# Patient Record
Sex: MALE | Race: WHITE | Employment: OTHER | URBAN - METROPOLITAN AREA
[De-identification: names, ages, dates, MRNs, and addresses within clinical notes are randomized per-mention and may not be internally consistent; named-entity substitution may affect disease eponyms.]

---

## 2017-01-31 DIAGNOSIS — G47.00 INSOMNIA: Primary | ICD-10-CM

## 2017-01-31 NOTE — TELEPHONE ENCOUNTER
Trazodone       Last Written Prescription Date: 5/17/2016  Last Fill Quantity: 180,  # refills: 3  Last Office Visit with [facility], [facility] or [facility] prescribing provider: 12/5/2016

## 2017-02-02 RX ORDER — TRAZODONE HYDROCHLORIDE 100 MG/1
TABLET ORAL
Qty: 180 TABLET | Refills: 3 | OUTPATIENT
Start: 2017-02-02

## 2017-02-02 NOTE — TELEPHONE ENCOUNTER
[facility] called regarding status of Trazodone refill. Please call. [phone].

## 2017-02-04 DIAGNOSIS — G47.01 INSOMNIA DUE TO MEDICAL CONDITION: ICD-10-CM

## 2017-02-06 NOTE — TELEPHONE ENCOUNTER
TRAZODONE 100 MG TABLET         Last Written Prescription Date: 5/17/2016  Last Fill Quantity: 180; # refills: 3  Last Office Visit with [facility], [facility] or  [facility] prescribing provider:  12/5/2016 BRAD        Last PHQ-9 score on record=   PHQ-9 SCORE 12/5/2016   Total Score -   Total Score 8       AST       18   8/30/2010  ALT       16   4/21/2011

## 2017-02-07 RX ORDER — TRAZODONE HYDROCHLORIDE 100 MG/1
TABLET ORAL
Qty: 180 TABLET | Refills: 3 | Status: SHIPPED | OUTPATIENT
Start: 2017-02-07 | End: 2017-06-02

## 2017-02-07 NOTE — TELEPHONE ENCOUNTER
Jeffy is calling to check on the status is this request. Please let him know if this will be filled or not.     Thank you. Charmaine Cloud, Patient Representative

## 2017-02-15 ENCOUNTER — TELEPHONE (OUTPATIENT)
Dept: FAMILY MEDICINE | Facility: CLINIC | Age: 67
End: 2017-02-15

## 2017-02-15 NOTE — TELEPHONE ENCOUNTER
Reason for Call:  Medication or medication refill:    Do you use a [facility]?  Name of the pharmacy and phone number for the current request:  [facility]    Name of the medication requested: pantoprazole (PROTONIX) 40 MG EC tablet needs to be changed to something different. His insurance will no longer pay for this    Other request:     Can we leave a detailed message on this number? YES    Phone number patient can be reached at: Cell number on file:    Telephone Information:   Mobile [phone]     Best Time: any    Call taken on 2/15/2017 at 4:27 PM by Angelica Hansen

## 2017-02-16 NOTE — TELEPHONE ENCOUNTER
I need someone to call his pharmacy or the patient and find out what we can switch him to.    Logan Dewey MD

## 2017-02-16 NOTE — TELEPHONE ENCOUNTER
I called the pharmacy and they tried running a rx for omeprazole and he also came back denied. She stated that she doesn't think that his insurance will cover any PPI's .  Hollie Joe MA

## 2017-04-19 DIAGNOSIS — N13.8 BPH WITH URINARY OBSTRUCTION: ICD-10-CM

## 2017-04-19 DIAGNOSIS — N40.1 BPH WITH URINARY OBSTRUCTION: ICD-10-CM

## 2017-04-19 NOTE — TELEPHONE ENCOUNTER
dutasteride (AVODART) 0.5 MG capsule         Last Written Prescription Date: 4/21/16  Last Fill Quantity: 90, # refills: 3    Last Office Visit with [facility], [facility] or [facility] prescribing provider:  12/5/16   Future Office Visit:      BP Readings from Last 3 Encounters:   12/05/16 110/70   09/02/16 126/74   05/05/16 118/70

## 2017-04-20 RX ORDER — DUTASTERIDE 0.5 MG/1
CAPSULE, LIQUID FILLED ORAL
Qty: 90 CAPSULE | Refills: 2 | Status: SHIPPED | OUTPATIENT
Start: 2017-04-20 | End: 2017-12-27

## 2017-04-25 ENCOUNTER — TELEPHONE (OUTPATIENT)
Dept: FAMILY MEDICINE | Facility: CLINIC | Age: 67
End: 2017-04-25

## 2017-04-25 NOTE — TELEPHONE ENCOUNTER
[facility] calling to fu and states this is not a covered medication and does need a prior auth.  Please advise  Thank you,  Tanvi Haynes  Patient Representative

## 2017-05-17 NOTE — TELEPHONE ENCOUNTER
Please call Chavez back regarding this pts PA.  Chavez states it is urgent and would appreciate a call back ASAP.

## 2017-05-31 NOTE — PROGRESS NOTES
"  SUBJECTIVE:                                                    Jeffy Rogers is a 66 year old male who presents to clinic today for the following health issues:  {Provider please address medication reconciliation discrepancies--rooming staff please delete if no med/rec issues}    New Patient/Transfer of Care  Leg Pain     Onset: ***    Description:   Location: {.:018279::\"***\"}  Character: {.:899806}    Intensity: {.:853716}    Progression of Symptoms: {.:740342:x}    Accompanying Signs & Symptoms:  Other symptoms: {.:413389::\"none\"}   History:   Previous similar pain: { :155170}      Precipitating factors:   Trauma or overuse: { :044434}    Alleviating factors:  Improved by: {.:973233::\"nothing\"}       Therapies Tried and outcome: ***      {additional problems for provider to add:258191}    Problem list and histories reviewed & adjusted, as indicated.  Additional history: {NONE - AS DOCUMENTED:124100::\"as documented\"}    {HIST REVIEW/ LINKS 2:763541}    Reviewed and updated as needed this visit by clinical staff       Reviewed and updated as needed this visit by Provider         {PROVIDER CHARTING PREFERENCE:074050}    "

## 2017-06-02 ENCOUNTER — OFFICE VISIT (OUTPATIENT)
Dept: FAMILY MEDICINE | Facility: OTHER | Age: 67
End: 2017-06-02
Payer: COMMERCIAL

## 2017-06-02 VITALS
TEMPERATURE: 99.4 F | WEIGHT: 172.4 LBS | SYSTOLIC BLOOD PRESSURE: 134 MMHG | HEIGHT: 70 IN | RESPIRATION RATE: 18 BRPM | DIASTOLIC BLOOD PRESSURE: 68 MMHG | BODY MASS INDEX: 24.68 KG/M2 | HEART RATE: 60 BPM

## 2017-06-02 DIAGNOSIS — R79.89 LOW TSH LEVEL: ICD-10-CM

## 2017-06-02 DIAGNOSIS — G89.29 CHRONIC RIGHT-SIDED LOW BACK PAIN WITH RIGHT-SIDED SCIATICA: ICD-10-CM

## 2017-06-02 DIAGNOSIS — N18.30 CKD (CHRONIC KIDNEY DISEASE) STAGE 3, GFR 30-59 ML/MIN (H): ICD-10-CM

## 2017-06-02 DIAGNOSIS — F51.04 PSYCHOPHYSIOLOGICAL INSOMNIA: Primary | ICD-10-CM

## 2017-06-02 DIAGNOSIS — M79.2 NERVE PAIN: ICD-10-CM

## 2017-06-02 DIAGNOSIS — G89.29 CHRONIC PAIN OF RIGHT LOWER EXTREMITY: ICD-10-CM

## 2017-06-02 DIAGNOSIS — M79.604 CHRONIC PAIN OF RIGHT LOWER EXTREMITY: ICD-10-CM

## 2017-06-02 DIAGNOSIS — M54.41 CHRONIC RIGHT-SIDED LOW BACK PAIN WITH RIGHT-SIDED SCIATICA: ICD-10-CM

## 2017-06-02 DIAGNOSIS — M79.604 PAIN OF RIGHT LOWER EXTREMITY: ICD-10-CM

## 2017-06-02 DIAGNOSIS — Z23 NEED FOR VACCINATION: ICD-10-CM

## 2017-06-02 DIAGNOSIS — I10 HYPERTENSION GOAL BP (BLOOD PRESSURE) < 140/90: ICD-10-CM

## 2017-06-02 DIAGNOSIS — F17.200 TOBACCO USE DISORDER: ICD-10-CM

## 2017-06-02 DIAGNOSIS — M48.061 FORAMINAL STENOSIS OF LUMBAR REGION: ICD-10-CM

## 2017-06-02 LAB — HGB BLD-MCNC: 13.2 G/DL (ref 13.3–17.7)

## 2017-06-02 PROCEDURE — 80053 COMPREHEN METABOLIC PANEL: CPT | Performed by: FAMILY MEDICINE

## 2017-06-02 PROCEDURE — 99214 OFFICE O/P EST MOD 30 MIN: CPT | Mod: 25 | Performed by: FAMILY MEDICINE

## 2017-06-02 PROCEDURE — 36415 COLL VENOUS BLD VENIPUNCTURE: CPT | Performed by: FAMILY MEDICINE

## 2017-06-02 PROCEDURE — 90471 IMMUNIZATION ADMIN: CPT | Performed by: FAMILY MEDICINE

## 2017-06-02 PROCEDURE — 85018 HEMOGLOBIN: CPT | Performed by: FAMILY MEDICINE

## 2017-06-02 PROCEDURE — 84443 ASSAY THYROID STIM HORMONE: CPT | Performed by: FAMILY MEDICINE

## 2017-06-02 PROCEDURE — 90670 PCV13 VACCINE IM: CPT | Performed by: FAMILY MEDICINE

## 2017-06-02 RX ORDER — CYCLOBENZAPRINE HCL 10 MG
10 TABLET ORAL 3 TIMES DAILY
Qty: 270 TABLET | Refills: 1 | Status: SHIPPED | OUTPATIENT
Start: 2017-06-02 | End: 2017-12-27

## 2017-06-02 RX ORDER — NORTRIPTYLINE HCL 10 MG
10-50 CAPSULE ORAL AT BEDTIME
Qty: 90 CAPSULE | Refills: 1 | Status: SHIPPED | OUTPATIENT
Start: 2017-06-02 | End: 2017-10-27

## 2017-06-02 RX ORDER — VARENICLINE TARTRATE 1 MG/1
1 TABLET, FILM COATED ORAL 2 TIMES DAILY
Qty: 56 TABLET | Refills: 2 | Status: SHIPPED | OUTPATIENT
Start: 2017-06-02 | End: 2017-12-27

## 2017-06-02 ASSESSMENT — ANXIETY QUESTIONNAIRES
2. NOT BEING ABLE TO STOP OR CONTROL WORRYING: NOT AT ALL
GAD7 TOTAL SCORE: 2
6. BECOMING EASILY ANNOYED OR IRRITABLE: SEVERAL DAYS
3. WORRYING TOO MUCH ABOUT DIFFERENT THINGS: NOT AT ALL
7. FEELING AFRAID AS IF SOMETHING AWFUL MIGHT HAPPEN: NOT AT ALL
IF YOU CHECKED OFF ANY PROBLEMS ON THIS QUESTIONNAIRE, HOW DIFFICULT HAVE THESE PROBLEMS MADE IT FOR YOU TO DO YOUR WORK, TAKE CARE OF THINGS AT HOME, OR GET ALONG WITH OTHER PEOPLE: NOT DIFFICULT AT ALL
5. BEING SO RESTLESS THAT IT IS HARD TO SIT STILL: NOT AT ALL
1. FEELING NERVOUS, ANXIOUS, OR ON EDGE: NOT AT ALL

## 2017-06-02 ASSESSMENT — PATIENT HEALTH QUESTIONNAIRE - PHQ9: 5. POOR APPETITE OR OVEREATING: SEVERAL DAYS

## 2017-06-02 ASSESSMENT — PAIN SCALES - GENERAL: PAINLEVEL: NO PAIN (0)

## 2017-06-02 NOTE — PATIENT INSTRUCTIONS
Thank you for visiting [facility]    Let's try a low dose of nortriptyline to help with your sleep and pain.  Can gradually increase this up to 50 mg at a time if needed.  Let me know if side effects.  Don't take with flexeril (separate by at least a few hours).    OK to continue with ibuprofen for pain.  Let's also try some physical therapy for this.  If worsening, then we should consider imaging or referral.    Take Chantix as we discussed.  Let me know if problems.    Contact us or return if questions or concerns.     We'll let you know your lab results as soon as we can.     Please see me in 1-2 months for follow up.       If you had imaging scheduled please refer to your radiology prep sheet.    Appointment    Date_______________     Time_____________    Day:   M TU W TH F    With____________________________    Location_________________________    If you need medication refills, please contact your pharmacy 3 days before your prescriptions runs out. If you are out of refills, your pharmacy will contact contact the clinic.    Contact us or return if questions or concerns.     -Your Care Team:  MD Marlys Jeffrey PA-C Folake Falaki, MD Anoshirvan Mazhari, MD Kelly White, CNP    General information about your clinic      Clinic hours:     Lab hours:  Phone [phone]  Monday 7:30 am-7 pm    Monday 8:30 am-6:30 pm  Tuesday-Friday 7:30 am-5 pm   Tuesday-Friday 8:30 am-4:30 pm    Pharmacy hours:  Phone [phone]  Monday 8:30 am-7pm  Tuesday-Friday 8:30am-6 pm                                       Mychart assistance [phone]        We would like to hear from you, how was your visit today?    Karla Dobbs  Patient Information Supervisor   Patient Care Supervisor  [facility], and [facility], and [facility]  [phone]

## 2017-06-02 NOTE — NURSING NOTE
Screening Questionnaire for Adult Immunization    Are you sick today?   No   Do you have allergies to medications, food, a vaccine component or latex?   No   Have you ever had a serious reaction after receiving a vaccination?   No   Do you have a long-term health problem with heart disease, lung disease, asthma, kidney disease, metabolic disease (e.g. diabetes), anemia, or other blood disorder?   No   Do you have cancer, leukemia, HIV/AIDS, or any other immune system problem?   No   In the past 3 months, have you taken medications that affect  your immune system, such as prednisone, other steroids, or anticancer drugs; drugs for the treatment of rheumatoid arthritis, Crohn s disease, or psoriasis; or have you had radiation treatments?   No   Have you had a seizure, or a brain or other nervous system problem?   No   During the past year, have you received a transfusion of blood or blood     products, or been given immune (gamma) globulin or antiviral drug?   No   For women: Are you pregnant or is there a chance you could become        pregnant during the next month?   No   Have you received any vaccinations in the past 4 weeks?   No     Immunization questionnaire answers were all negative.      MNVFC doesn't apply on this patient    Per orders of Dr. Browne, injection of PCV 13 given by Karo March. Patient instructed to remain in clinic for 20 minutes afterwards, and to report any adverse reaction to me immediately.       Screening performed by Karo March on 6/2/2017 at 4:20 PM.

## 2017-06-02 NOTE — PROGRESS NOTES
SUBJECTIVE:                                                    Jeffy Rogers is a 66 year old male who presents to clinic today for the following health issues:      New Patient/Transfer of Care  Depression Followup    Status since last visit: Depends on the situation    See PHQ-9 for current symptoms.  Other associated symptoms: insomnia    Complicating factors:   Significant life event:  No   Current substance abuse:  None  Anxiety or Panic symptoms:  No    PHQ-9  English PHQ-9   Any Language          Chronic Kidney Disease Follow-up      Current NSAID use?  Yes:   ibuprofen (Motrin) and aspirin  Frequency: daily      Chronic Pain Follow-Up       Type / Location of Pain: leg/hip  Analgesia/pain control:       Recent changes:  same      Overall control: Tolerable with discomfort  Activity level/function:      Daily activities:  Can do most things most days, with some rest    Work:  not applicable  Adverse effects:  No  Adherance    Taking medication as directed?  Yes    Participating in other treatments: no - open to PT  Risk Factors:    Sleep:  Poor feels as though the trazodone is not working     Mood/anxiety:  controlled    Recent family or social stressors:  none noted    Other aggravating factors: Climbing stairs, standing for long periods of time and walking   PHQ-9 SCORE 11/20/2015 4/21/2016 12/5/2016   Total Score - - -   Total Score 1 6 8     DEBRA-7 SCORE 10/28/2014 12/5/2016   Total Score 0 -   Total Score - 0     Encounter-Level CSA:     There are no encounter-level csa.             Amount of exercise or physical activity: Moving around daily- mowing lawn, gardening, walking dogs    Problems taking medications regularly: No    Medication side effects: none    Diet: regular (no restrictions)    Mr. Rogers presents to clinic with concerns related to sleep, right thigh pain, and smoking cessation.    His sleep has been a problem for several years. He's been taking trazodone since the early 2000s. It seems  "to be less effective for about 6 months. He goes to bed at 11 pm or 12 am watching TV in bed. He has trouble falling asleep. He wakes up around 5 am when his wife leaves for work. He wakes up at 5 on the weekends too.      His right thigh pain and numbness has bothered him for 3-4 years. It's his right anterior and lateral thigh. It's burning-type pain that improves when he brings his knee to his chest. He takes ibuprofen with limited relief. States he's tried PT, worried about effect on income.    He wants Chantix for smoking cessation. He did this about a year ago and he quit due to nausea. He also reports bad dreams on this. But he thought it was effective for stopping cravings and he wants to try it again. He tried the patch in the past.    Reviewed and updated as needed this visit by clinical staff  Tobacco  Allergies  Med Hx  Surg Hx  Fam Hx  Soc Hx      Reviewed and updated as needed this visit by Provider         ROS:  C: NEGATIVE for fever, chills, change in weight  I: NEGATIVE for worrisome rashes, moles or lesions  E: NEGATIVE for vision changes or irritation  E/M: NEGATIVE for ear, mouth and throat problems  R: NEGATIVE for significant cough or SOB  CV: NEGATIVE for chest pain, palpitations or peripheral edema  GI: NEGATIVE for nausea, abdominal pain, or change in bowel habits  : NEGATIVE for frequency, dysuria, or hematuria  M: See HPI  N: NEGATIVE for weakness, dizziness  E: NEGATIVE for temperature intolerance, skin/hair changes  H: NEGATIVE for bleeding problems  P: NEGATIVE for changes in mood or affect    OBJECTIVE:                                                    /68 (Cuff Size: Adult Regular)  Pulse 60  Temp 99.4  F (37.4  C) (Temporal)  Resp 18  Ht 5' 10\" (1.778 m)  Wt 172 lb 6.4 oz (78.2 kg)  BMI 24.74 kg/m2  Body mass index is 24.74 kg/(m^2).     GENERAL: healthy, alert and no distress  EYES: Eyes grossly normal to inspection, PERRL and conjunctivae and sclerae " normal  HENT: ear canals and TM's normal, nose and mouth without ulcers or lesions  NECK: no adenopathy, no asymmetry, masses, or scars and thyroid normal to palpation  RESP: lungs clear to auscultation - no rales, rhonchi or wheezes  CV: regular rate and rhythm, normal S1 S2, no S3 or S4, no murmur, click or rub, no peripheral edema and peripheral pulses strong  ABDOMEN: soft, nontender, no hepatosplenomegaly, no masses and bowel sounds normal  MS: no gross musculoskeletal defects noted, no edema, no back tenderness  SKIN: no suspicious lesions or rashes  NEURO: 3/5 right thigh flexor strength, 5/5 left thigh flexor strength, patellar reflexes 2+ bilaterally, 3/5 leg flexor and extensor strength on right and 5/5 on left  PSYCH: mentation appears normal, affect normal/bright    Diagnostic Test Results:  Results for orders placed or performed in visit on 12/12/16   T4, free   Result Value Ref Range    T4 Free 0.78 0.76 - 1.46 ng/dL   T3, Free   Result Value Ref Range    Free T3 2.2 (L) 2.3 - 4.2 pg/mL        ASSESSMENT/PLAN:                                                      1. Psychophysiological insomnia  - Problem for several years, treated successfully with trazodone until 6 months ago. He notices trouble falling asleep and is sleeping 5-6 hours instead of 7-8 hours per night. We counseled him on sleep hygiene including not using TV before bed. Discussed options at some length.  Pt was interested in trial of nortriptyline in place of trazodone.  Would also consider Seroquel if not responding.  Can titrate his dose of nortriptyline within parameters given.  Could consider sedative/hypnotics, but would prefer to avoid in his age category.  - nortriptyline (PAMELOR) 10 MG capsule; Take 1-5 capsules (10-50 mg) by mouth At Bedtime  Dispense: 90 capsule; Refill: 1    2. Pain of right lower extremity  - This is likely due to foraminal stenosis in his lower back. The distribution fits an L3 dermatome and he is known to  have mild disc bulge without herniation and central canal stenosis here based on 7/2015 MRI. He has decreased thigh flexor strength on exam. We're recommending PT (can consider once per month as he's concerned about cost), and the nortriptyline for his insomnia may reduce the pain as well, and he can continue ibuprofen.  Pt didn't discuss narcotics with me (but did with the student).  Would need to do a formal narcotics agreement to consider this.  - PHYSICAL THERAPY REFERRAL  - nortriptyline (PAMELOR) 10 MG capsule; Take 1-5 capsules (10-50 mg) by mouth At Bedtime  Dispense: 90 capsule; Refill: 1    3. Foraminal stenosis of lumbar region  - See No. 2  - PHYSICAL THERAPY REFERRAL  - nortriptyline (PAMELOR) 10 MG capsule; Take 1-5 capsules (10-50 mg) by mouth At Bedtime  Dispense: 90 capsule; Refill: 1    4. Tobacco use disorder  - He's interested in quitting. He tried Chantix in spring 2016 and found it helpful, though he reports vivid dreams and ultimately quit due to nausea. He's tried the patch in the past but this wasn't helpful. He specifically requests another course of Chantix.  - TOBACCO CESSATION - FOR HEALTH MAINTENANCE  - varenicline (CHANTIX) 1 MG tablet; Take 1 tablet (1 mg) by mouth 2 times daily  Dispense: 56 tablet; Refill: 2  - varenicline (CHANTIX STARTING MONTH PAK) 0.5 MG X 11 & 1 MG X 42 tablet; Take 0.5 mg tab daily for 3 days, then 0.5 mg tab twice daily for 4 days, then 1 mg twice daily.  Dispense: 53 tablet; Refill: 0    5. CKD (chronic kidney disease) stage 3, GFR 30-59 ml/min  Will check labs.  Work on risk factor reduction.  - Hemoglobin  - Comprehensive metabolic panel    6. Hypertension goal BP (blood pressure) < 140/90  Currently Controlled.  Continue current regimen.  Check labs.  Call/return if any problems or questions arise.   - Comprehensive metabolic panel    7. Low TSH level  - Anomalous TSH level of 0.08 in 12/2016. Rechecking.  - TSH with free T4 reflex    8. Chronic  right-sided low back pain with right-sided sciatica  - See No. 2 above  - cyclobenzaprine (FLEXERIL) 10 MG tablet; Take 1 tablet (10 mg) by mouth 3 times daily  Dispense: 270 tablet; Refill: 1  - nortriptyline (PAMELOR) 10 MG capsule; Take 1-5 capsules (10-50 mg) by mouth At Bedtime  Dispense: 90 capsule; Refill: 1    9. Chronic pain of right lower extremity  - See No. 2 above  - cyclobenzaprine (FLEXERIL) 10 MG tablet; Take 1 tablet (10 mg) by mouth 3 times daily  Dispense: 270 tablet; Refill: 1    10. Nerve pain  - See No. 2 above  - cyclobenzaprine (FLEXERIL) 10 MG tablet; Take 1 tablet (10 mg) by mouth 3 times daily  Dispense: 270 tablet; Refill: 1    11. Need for vaccination  - Pneumococcal vaccine 13 valent PCV13 IM (Prevnar) [23640]  - ADMIN: Vaccine, Initial (02711)        Patient Instructions   Thank you for visiting [facility]    Let's try a low dose of nortriptyline to help with your sleep and pain.  Can gradually increase this up to 50 mg at a time if needed.  Let me know if side effects.  Don't take with flexeril (separate by at least a few hours).    OK to continue with ibuprofen for pain.  Let's also try some physical therapy for this.  If worsening, then we should consider imaging or referral.    Take Chantix as we discussed.  Let me know if problems.    Contact us or return if questions or concerns.     We'll let you know your lab results as soon as we can.     Please see me in 1-2 months for follow up.       If you had imaging scheduled please refer to your radiology prep sheet.    Appointment    Date_______________     Time_____________    Day:   M TU W TH F    With____________________________    Location_________________________    If you need medication refills, please contact your pharmacy 3 days before your prescriptions runs out. If you are out of refills, your pharmacy will contact contact the clinic.    Contact us or return if questions or concerns.     -Your Care  Team:  MD Marlys Jeffrey PA-C Folake Falaki, MD Anoshirvan Mazhari, MD Kelly White, CNP    General information about your clinic      Clinic hours:     Lab hours:  Phone [phone]  Monday 7:30 am-7 pm    Monday 8:30 am-6:30 pm  Tuesday-Friday 7:30 am-5 pm   Tuesday-Friday 8:30 am-4:30 pm    Pharmacy hours:  Phone [phone]  Monday 8:30 am-7pm  Tuesday-Friday 8:30am-6 pm                                       Mychart assistance [phone]        We would like to hear from you, how was your visit today?    Karla oDbbs  Patient Information Supervisor   Patient Care Supervisor  [facility], and [facility]  [phone]         Scribed by Black Johnson MS4.    This patient was seen and examined by myself as well as the medical student.  The medical student has scribed the note and I have reviewed it, edited it appropriately and agree with the final documentation. Electronically signed by MD Benton Jeffrey MD, MD  [facility]

## 2017-06-02 NOTE — NURSING NOTE
"Chief Complaint   Patient presents with     Establish Care     Musculoskeletal Problem     Wants chantix     Panel Management     PCV13, Fall risk, Tobacco cessation, phq, amira, HGB, CSA, Urine drug screen       Initial /68 (Cuff Size: Adult Regular)  Pulse 60  Temp 99.4  F (37.4  C) (Temporal)  Resp 18  Ht 5' 10\" (1.778 m)  Wt 172 lb 6.4 oz (78.2 kg)  BMI 24.74 kg/m2 Estimated body mass index is 24.74 kg/(m^2) as calculated from the following:    Height as of this encounter: 5' 10\" (1.778 m).    Weight as of this encounter: 172 lb 6.4 oz (78.2 kg).  Medication Reconciliation: complete   Ally Murrell CMA (AAMA)    "

## 2017-06-02 NOTE — MR AVS SNAPSHOT
After Visit Summary   6/2/2017    Jeffy Rogers    MRN: [id number]           Patient Information     Date Of Birth          1950        Visit Information        Provider Department      6/2/2017 2:45 PM Benton Browne MD [facility]        Today's Diagnoses     Psychophysiological insomnia    -  1    Pain of right lower extremity        Foraminal stenosis of lumbar region        Tobacco use disorder        CKD (chronic kidney disease) stage 3, GFR 30-59 ml/min        Hypertension goal BP (blood pressure) < 140/90        Low TSH level        Chronic right-sided low back pain with right-sided sciatica        Chronic pain of right lower extremity        Nerve pain          Care Instructions    Thank you for visiting [facility] [place]    Let's try a low dose of nortriptyline to help with your sleep and pain.  Can gradually increase this up to 50 mg at a time if needed.  Let me know if side effects.  Don't take with flexeril (separate by at least a few hours).    OK to continue with ibuprofen for pain.  Let's also try some physical therapy for this.  If worsening, then we should consider imaging or referral.    Take Chantix as we discussed.  Let me know if problems.    Contact us or return if questions or concerns.     We'll let you know your lab results as soon as we can.     Please see me in 1-2 months for follow up.       If you had imaging scheduled please refer to your radiology prep sheet.    Appointment    Date_______________     Time_____________    Day:   M TU W TH F    With____________________________    Location_________________________    If you need medication refills, please contact your pharmacy 3 days before your prescriptions runs out. If you are out of refills, your pharmacy will contact contact the clinic.    Contact us or return if questions or concerns.     -Your Care Team:  MD Marlys Jeffrey PA-C Folake Falaki  "MD Cindy Chaudhari CNP    General information about your clinic      Clinic hours:     Lab hours:  Phone [phone]  Monday 7:30 am-7 pm    Monday 8:30 am-6:30 pm  Tuesday-Friday 7:30 am-5 pm   Tuesday-Friday 8:30 am-4:30 pm    Pharmacy hours:  Phone [phone]  Monday 8:30 am-7pm  Tuesday-Friday 8:30am-6 pm                                       Mychart assistance [phone]        We would like to hear from you, how was your visit today?    Karla Dobbs  Patient Information Supervisor   Patient Care Supervisor  [facility], and [facility], and [facility]  [phone]             Follow-ups after your visit        Additional Services     PHYSICAL THERAPY REFERRAL       *This therapy referral will be filtered to a centralized scheduling office at [facility] and the patient will receive a call to schedule an appointment at a [place] location most convenient for them. *     [facility] provides Physical Therapy evaluation and treatment and many specialty services across the [place] system.  If requesting a specialty program, please choose from the list below.    If you have not heard from the scheduling office within 2 business days, please call [phone] for all locations, with the exception of [place], please call [phone].  Treatment: Evaluation & Treatment  Special Instructions/Modalities: eval and treat  Special Programs:     Please be aware that coverage of these services is subject to the terms and limitations of your health insurance plan.  Call member services at your health plan with any benefit or coverage questions.      **Note to Provider:  If you are referring outside of [place] for the therapy appointment, please list the name of the location in the \"special instructions\" above, print the referral and give to the patient to schedule the " "appointment.                  Who to contact     If you have questions or need follow up information about today's clinic visit or your schedule please contact [facility] directly at [phone].  Normal or non-critical lab and imaging results will be communicated to you by [employer]hart, letter or phone within 4 business days after the clinic has received the results. If you do not hear from us within 7 days, please contact the clinic through [employer]hart or phone. If you have a critical or abnormal lab result, we will notify you by phone as soon as possible.  Submit refill requests through [employer] or call your pharmacy and they will forward the refill request to us. Please allow 3 business days for your refill to be completed.          Additional Information About Your Visit        [employer] Information     [employer] gives you secure access to your electronic health record. If you see a primary care provider, you can also send messages to your care team and make appointments. If you have questions, please call your primary care clinic.  If you do not have a primary care provider, please call [phone] and they will assist you.        Care EveryWhere ID     This is your Care EveryWhere ID. This could be used by other organizations to access your [facility] medical records  [id number]        Your Vitals Were     Pulse Temperature Respirations Height BMI (Body Mass Index)       60 99.4  F (37.4  C) (Temporal) 18 5' 10\" (1.778 m) 24.74 kg/m2        Blood Pressure from Last 3 Encounters:   06/02/17 134/68   12/05/16 110/70   09/02/16 126/74    Weight from Last 3 Encounters:   06/02/17 172 lb 6.4 oz (78.2 kg)   12/05/16 163 lb (73.9 kg)   09/02/16 156 lb (70.8 kg)              We Performed the Following     Comprehensive metabolic panel     Hemoglobin     PHYSICAL THERAPY REFERRAL     TOBACCO CESSATION - FOR HEALTH MAINTENANCE     TSH with free T4 reflex          Today's Medication Changes          These changes " are accurate as of: 6/2/17  4:12 PM.  If you have any questions, ask your nurse or doctor.               Start taking these medicines.        Dose/Directions    nortriptyline 10 MG capsule   Commonly known as:  PAMELOR   Used for:  Psychophysiological insomnia, Pain of right lower extremity, Foraminal stenosis of lumbar region, Chronic right-sided low back pain with right-sided sciatica   Started by:  Benton Browne MD        Dose:  10-50 mg   Take 1-5 capsules (10-50 mg) by mouth At Bedtime   Quantity:  90 capsule   Refills:  1       * varenicline 1 MG tablet   Commonly known as:  CHANTIX   Used for:  Tobacco use disorder   Started by:  Benton Browne MD        Dose:  1 mg   Take 1 tablet (1 mg) by mouth 2 times daily   Quantity:  56 tablet   Refills:  2       * varenicline 0.5 MG X 11 & 1 MG X 42 tablet   Commonly known as:  CHANTIX STARTING MONTH PAK   Used for:  Tobacco use disorder   Started by:  Benton Browne MD        Take 0.5 mg tab daily for 3 days, then 0.5 mg tab twice daily for 4 days, then 1 mg twice daily.   Quantity:  53 tablet   Refills:  0       * Notice:  This list has 2 medication(s) that are the same as other medications prescribed for you. Read the directions carefully, and ask your doctor or other care provider to review them with you.      Stop taking these medicines if you haven't already. Please contact your care team if you have questions.     traZODone 100 MG tablet   Commonly known as:  DESYREL   Stopped by:  Benton Browne MD                Where to get your medicines      These medications were sent to [facility] - [address]  [address] [address]     Phone:  [phone]     cyclobenzaprine 10 MG tablet    nortriptyline 10 MG capsule    varenicline 0.5 MG X 11 & 1 MG X 42 tablet    varenicline 1 MG tablet                Primary Care Provider Office Phone # Fax #    Logan Dewey MD  [phone]       [facility] [place] [address] [place] [address]        Thank you!     Thank you for choosing [facility]  for your care. Our goal is always to provide you with excellent care. Hearing back from our patients is one way we can continue to improve our services. Please take a few minutes to complete the written survey that you may receive in the mail after your visit with us. Thank you!             Your Updated Medication List - Protect others around you: Learn how to safely use, store and throw away your medicines at www.disposemymeds.org.          This list is accurate as of: 6/2/17  4:12 PM.  Always use your most recent med list.                   Brand Name Dispense Instructions for use    ascorbic acid 1000 MG Tabs tablet     30    1 TABLET DAILY AT DINNER       aspirin 81 MG tablet     100    1 TABLET DAILY       atorvastatin 80 MG tablet    LIPITOR    90 tablet    Take 1 tablet (80 mg) by mouth daily       calcium carbonate-Vit D-Min 8962-0844 MG-UNIT Chew      1 TABLET THREE TIMES DAILY       cyclobenzaprine 10 MG tablet    FLEXERIL    270 tablet    Take 1 tablet (10 mg) by mouth 3 times daily       dutasteride 0.5 MG capsule    AVODART    90 capsule    TAKE 1 CAPSULE (0.5 MG) BY MOUTH DAILY       lidocaine 5 % Patch    LIDODERM    30 patch    Apply up to 3 patches to painful area at once for up to 12 h within a 24 h period.  Remove after 12 hours.       lisinopril 10 MG tablet    PRINIVIL/ZESTRIL    90 tablet    Take 1 tablet (10 mg) by mouth daily       metoprolol 50 MG 24 hr tablet    TOPROL-XL    90 tablet    TAKE 1 TABLET BY MOUTH EVERY DAY       Multi-vitamin Tabs tablet   Generic drug:  multivitamin, therapeutic with minerals     0    1 TABLET DAILY       nortriptyline 10 MG capsule    PAMELOR    90 capsule    Take 1-5 capsules (10-50 mg) by mouth At Bedtime       pantoprazole 40 MG EC tablet    PROTONIX    90 tablet    Take 1 tablet (40 mg) by  mouth daily       sertraline 100 MG tablet    ZOLOFT    180 tablet    Take 2 tablets (200 mg) by mouth daily       tamsulosin 0.4 MG capsule    FLOMAX    90 capsule    Take 1 capsule (0.4 mg) by mouth At Bedtime       * varenicline 1 MG tablet    CHANTIX    56 tablet    Take 1 tablet (1 mg) by mouth 2 times daily       * varenicline 0.5 MG X 11 & 1 MG X 42 tablet    CHANTIX STARTING MONTH DORIS    53 tablet    Take 0.5 mg tab daily for 3 days, then 0.5 mg tab twice daily for 4 days, then 1 mg twice daily.       vitamin D 1000 UNITS capsule     0    1 CAPSULE DAILY       * Notice:  This list has 2 medication(s) that are the same as other medications prescribed for you. Read the directions carefully, and ask your doctor or other care provider to review them with you.

## 2017-06-03 LAB
ALBUMIN SERPL-MCNC: 3.9 G/DL (ref 3.4–5)
ALP SERPL-CCNC: 103 U/L (ref 40–150)
ALT SERPL W P-5'-P-CCNC: 20 U/L (ref 0–70)
ANION GAP SERPL CALCULATED.3IONS-SCNC: 6 MMOL/L (ref 3–14)
AST SERPL W P-5'-P-CCNC: 14 U/L (ref 0–45)
BILIRUB SERPL-MCNC: 0.2 MG/DL (ref 0.2–1.3)
BUN SERPL-MCNC: 15 MG/DL (ref 7–30)
CALCIUM SERPL-MCNC: 9.1 MG/DL (ref 8.5–10.1)
CHLORIDE SERPL-SCNC: 109 MMOL/L (ref 94–109)
CO2 SERPL-SCNC: 26 MMOL/L (ref 20–32)
CREAT SERPL-MCNC: 1.17 MG/DL (ref 0.66–1.25)
GFR SERPL CREATININE-BSD FRML MDRD: 62 ML/MIN/1.7M2
GLUCOSE SERPL-MCNC: 88 MG/DL (ref 70–99)
POTASSIUM SERPL-SCNC: 5.1 MMOL/L (ref 3.4–5.3)
PROT SERPL-MCNC: 7.2 G/DL (ref 6.8–8.8)
SODIUM SERPL-SCNC: 141 MMOL/L (ref 133–144)
TSH SERPL DL<=0.005 MIU/L-ACNC: 0.51 MU/L (ref 0.4–4)

## 2017-06-03 ASSESSMENT — ANXIETY QUESTIONNAIRES: GAD7 TOTAL SCORE: 2

## 2017-06-03 ASSESSMENT — PATIENT HEALTH QUESTIONNAIRE - PHQ9: SUM OF ALL RESPONSES TO PHQ QUESTIONS 1-9: 5

## 2017-07-03 DIAGNOSIS — I10 HYPERTENSION GOAL BP (BLOOD PRESSURE) < 140/90: ICD-10-CM

## 2017-07-03 RX ORDER — LISINOPRIL 10 MG/1
10 TABLET ORAL DAILY
Qty: 90 TABLET | Refills: 1 | Status: SHIPPED | OUTPATIENT
Start: 2017-07-03 | End: 2017-12-27

## 2017-07-03 NOTE — TELEPHONE ENCOUNTER
Prescription approved per [facility] Refill Protocol.  Eduardo Tyler, RN, BSN

## 2017-07-03 NOTE — TELEPHONE ENCOUNTER
Routing refill to Dr. Browne as pt has transferred care.  Please advise.    Edwin Calderon RN, BSN

## 2017-07-03 NOTE — TELEPHONE ENCOUNTER
Lisinopril       Last Written Prescription Date: 12/5/2016  Last Fill Quantity: 90, # refills: 1  Last Office Visit with G, P or [facility] prescribing provider: 6/2/2017       Potassium   Date Value Ref Range Status   06/02/2017 5.1 3.4 - 5.3 mmol/L Final     Creatinine   Date Value Ref Range Status   06/02/2017 1.17 0.66 - 1.25 mg/dL Final     BP Readings from Last 3 Encounters:   06/02/17 134/68   12/05/16 110/70   09/02/16 126/74

## 2017-07-11 DIAGNOSIS — I10 HYPERTENSION GOAL BP (BLOOD PRESSURE) < 140/90: ICD-10-CM

## 2017-07-12 NOTE — TELEPHONE ENCOUNTER
lisinopril (PRINIVIL/ZESTRIL) 10 MG tablet 90 tablet 1 7/3/2017  No      Sig: Take 1 tablet (10 mg) by mouth daily     Patient just has script filled with refill.  Jose De Jesus Mario MA

## 2017-07-14 RX ORDER — LISINOPRIL 10 MG/1
TABLET ORAL
Qty: 90 TABLET | Refills: 1 | OUTPATIENT
Start: 2017-07-14

## 2017-07-18 DIAGNOSIS — I10 HYPERTENSION GOAL BP (BLOOD PRESSURE) < 140/90: ICD-10-CM

## 2017-07-19 NOTE — TELEPHONE ENCOUNTER
lisinopril (PRINIVIL/ZESTRIL) 10 MG tablet 90 tablet 1 7/3/2017  No      Sig: Take 1 tablet (10 mg) by mouth daily     Patient just had filled with refills.  Jose De Jesus Mario MA    ========================================    Metoprolol       Last Written Prescription Date: 8/17/16  Last Fill Quantity: 90, # refills: 2    Last Office Visit with [facility], [facility] or [facility] prescribing provider:  6/2/17   Future Office Visit:        BP Readings from Last 3 Encounters:   06/02/17 134/68   12/05/16 110/70   09/02/16 126/74

## 2017-07-20 RX ORDER — LISINOPRIL 10 MG/1
TABLET ORAL
Qty: 90 TABLET | Refills: 1 | OUTPATIENT
Start: 2017-07-20

## 2017-07-20 RX ORDER — METOPROLOL SUCCINATE 50 MG/1
TABLET, EXTENDED RELEASE ORAL
Qty: 90 TABLET | Refills: 2 | Status: SHIPPED | OUTPATIENT
Start: 2017-07-20 | End: 2017-12-27

## 2017-07-20 NOTE — TELEPHONE ENCOUNTER
Lisinopril  Filled for 90 tabs with 1 refill on 7/3/17.  Refill refused.    Metoprolol  Routing refill request to covering provider for review/approval because:  A break in medication    Edwin Calderon RN, BSN

## 2017-08-22 ENCOUNTER — TELEPHONE (OUTPATIENT)
Dept: FAMILY MEDICINE | Facility: CLINIC | Age: 67
End: 2017-08-22

## 2017-08-22 DIAGNOSIS — F32.5 MAJOR DEPRESSION IN COMPLETE REMISSION (H): Primary | ICD-10-CM

## 2017-08-22 NOTE — LETTER
[address] [address]  [phone]        August 22, 2017    Jeffy Rogers  [id number] [address] [address]          Dear Jeffy,          This is some good info that we received in our office and I wanted to share this with you.  Please look it over and let me know if you are interested.  I would be more than happy to get you more information or help you get setup.     Sincerely,        Logan Dewey M.D.

## 2017-08-22 NOTE — TELEPHONE ENCOUNTER
Panel Management Review      Patient has the following on his problem list:     Depression / Dysthymia review  PHQ-9 SCORE 4/21/2016 12/5/2016 6/2/2017   Total Score - - -   Total Score 6 8 5      Patient is due for:  DAP and mailed out QUIT PLAN for smoking.         Composite cancer screening  Chart review shows that this patient is due/due soon for the following None  Summary:    Patient is due/failing the following:   Smoking     Action needed:   Smoking, mailed out quit plan info     Type of outreach:    Mailed out Quit plan info with letter to contact us.     Questions for provider review:    None                                                                                                                                    kh       Chart routed to  .

## 2017-09-18 DIAGNOSIS — E78.5 HYPERLIPIDEMIA LDL GOAL <100: ICD-10-CM

## 2017-09-20 RX ORDER — ATORVASTATIN CALCIUM 80 MG/1
80 TABLET, FILM COATED ORAL DAILY
Qty: 90 TABLET | Refills: 0 | Status: SHIPPED | OUTPATIENT
Start: 2017-09-20 | End: 2017-12-27

## 2017-09-29 ENCOUNTER — TELEPHONE (OUTPATIENT)
Dept: FAMILY MEDICINE | Facility: OTHER | Age: 67
End: 2017-09-29

## 2017-09-29 DIAGNOSIS — F17.200 TOBACCO USE DISORDER: ICD-10-CM

## 2017-09-29 NOTE — TELEPHONE ENCOUNTER
Pt says he lost his Chantix Starter Pack and is looking to get another one. If approved please send new rx or let pharmacy know if denied. Thanks    Edwin Tomlin  Pharmacy Float Tech  On Behalf of [facility]

## 2017-10-27 DIAGNOSIS — M48.061 FORAMINAL STENOSIS OF LUMBAR REGION: ICD-10-CM

## 2017-10-27 DIAGNOSIS — M79.604 PAIN OF RIGHT LOWER EXTREMITY: ICD-10-CM

## 2017-10-27 DIAGNOSIS — F51.04 PSYCHOPHYSIOLOGICAL INSOMNIA: ICD-10-CM

## 2017-10-27 DIAGNOSIS — M54.41 CHRONIC RIGHT-SIDED LOW BACK PAIN WITH RIGHT-SIDED SCIATICA: ICD-10-CM

## 2017-10-27 DIAGNOSIS — G89.29 CHRONIC RIGHT-SIDED LOW BACK PAIN WITH RIGHT-SIDED SCIATICA: ICD-10-CM

## 2017-10-30 RX ORDER — NORTRIPTYLINE HCL 10 MG
CAPSULE ORAL
Qty: 45 CAPSULE | Refills: 0 | Status: SHIPPED | OUTPATIENT
Start: 2017-10-30 | End: 2017-12-27

## 2017-10-30 NOTE — TELEPHONE ENCOUNTER
Pamelor:  Routing refill request to provider for review/approval because:  Patient needs to be seen because:  Overdue for follow up appt.     Sofia Newsome, RN, BSN

## 2017-10-30 NOTE — TELEPHONE ENCOUNTER
Your medication has been refilled.  Please schedule a visit to follow up with provider of your choice on how this medication is working for you before your next refill.  We need to be sure everything is working well and stable prior to further refills.

## 2017-11-01 NOTE — TELEPHONE ENCOUNTER
Spoke with pt and gave information below. Pt is scheduled for follow up.    Jocelynn White CMA ([facility])

## 2017-11-06 DIAGNOSIS — G47.01 INSOMNIA DUE TO MEDICAL CONDITION: ICD-10-CM

## 2017-11-06 DIAGNOSIS — F43.20 ADJUSTMENT DISORDER, UNSPECIFIED TYPE: ICD-10-CM

## 2017-11-10 RX ORDER — SERTRALINE HYDROCHLORIDE 100 MG/1
TABLET, FILM COATED ORAL
Qty: 60 TABLET | Refills: 0 | Status: SHIPPED | OUTPATIENT
Start: 2017-11-10 | End: 2017-12-12

## 2017-11-10 RX ORDER — TRAZODONE HYDROCHLORIDE 100 MG/1
TABLET ORAL
Qty: 180 TABLET | Refills: 3 | OUTPATIENT
Start: 2017-11-10

## 2017-11-10 NOTE — TELEPHONE ENCOUNTER
Trazodone      Last Written Prescription Date: 2/7/17  Last Fill Quantity: 180,  # refills: 3 - Discontinued 6/2017 Ineffective                                        Next 5 appointments (look out 90 days)     Dec 01, 2017  2:30 PM CST   [facility]    [address] [address] [phone]                  Declined as patient has an alternate therapy.  Closing this encounter.  Hollie Boyer RN

## 2017-11-10 NOTE — TELEPHONE ENCOUNTER
Zoloft     Last Written Prescription Date: 12/5/16  Last Fill Quantity: 180, # refills: 1  Last Office Visit with [facility] primary care provider:  5/24/17   Next 5 appointments (look out 90 days)     Dec 01, 2017  2:30 PM CST   Office Visit with Benton Browne MD   [facility]    [address] [address] [phone]                   Last PHQ-9 score on record=   PHQ-9 SCORE 6/2/2017   Total Score -   Total Score 5       Routing refill request to provider for review/approval because:  Labs out of range:  PHQ-9  A break in medication  T'd up 1 month for PCP review    Will forward to schedulers to schedule patient for OV (Zoloft review).  Hollie Boyer RN

## 2017-11-13 NOTE — TELEPHONE ENCOUNTER
Patient is scheduled for an appt with Dr. Browne.  Thank you,  Radha Hansen   for [facility]

## 2017-11-26 DIAGNOSIS — G47.01 INSOMNIA DUE TO MEDICAL CONDITION: ICD-10-CM

## 2017-11-29 RX ORDER — TRAZODONE HYDROCHLORIDE 100 MG/1
TABLET ORAL
Qty: 30 TABLET | Refills: 0 | Status: SHIPPED | OUTPATIENT
Start: 2017-11-29 | End: 2017-12-05

## 2017-11-29 NOTE — TELEPHONE ENCOUNTER
Routing refill request to provider for review/approval because:  Drug not on the [facility] refill protocol   Medication was discontinued 6/2/2017  Marlys Lincoln RN    Trazodone     Last Written Prescription Date:   Last Fill Quantity: , # refills:   Last Office Visit with [facility] primary care provider:  6/2/2017   Next 5 appointments (look out 90 days)     Dec 01, 2017  2:30 PM CST   [facility] Benton [facility]   [facility]    [address] [address] [phone]                 Last PHQ-9 score on record=   PHQ-9 SCORE 6/2/2017   Total Score -   Total Score 5

## 2017-11-29 NOTE — TELEPHONE ENCOUNTER
It appears he is now seeing Dr. Browne in [place].  Will forward to him for further consideration.    Logan Dewey MD

## 2017-11-30 NOTE — TELEPHONE ENCOUNTER
Pt cancelled his appointment for tomorrow.  Cannot continue to refill without follow up.  Pt was asked to follow up months ago and has not.  I am not comfortable giving a 90-day supply as he will not follow up until after it's out.

## 2017-12-01 NOTE — TELEPHONE ENCOUNTER
Pt is now scheduled for 12/27/17.  Provider please review/advise.  I informed pt that we will need to see him for a follow up so we can make sure his medications are working well for him  Jong Rasmussen, CMA

## 2017-12-05 ENCOUNTER — TELEPHONE (OUTPATIENT)
Dept: FAMILY MEDICINE | Facility: OTHER | Age: 67
End: 2017-12-05

## 2017-12-05 DIAGNOSIS — G47.01 INSOMNIA DUE TO MEDICAL CONDITION: ICD-10-CM

## 2017-12-05 RX ORDER — TRAZODONE HYDROCHLORIDE 100 MG/1
TABLET ORAL
Qty: 180 TABLET | Refills: 0 | Status: SHIPPED | OUTPATIENT
Start: 2017-12-05 | End: 2017-12-27

## 2017-12-05 NOTE — TELEPHONE ENCOUNTER
Reason for call:  Patient is out of trazadone and would like to get a refill on this today.  He needs a 3 month supply or will take enough to get him through to his next appointment.   YURIY Rosario.  Ok to leave a detailed message.

## 2017-12-05 NOTE — TELEPHONE ENCOUNTER
Requested Prescriptions   Signed Prescriptions Disp Refills     traZODone (DESYREL) 100 MG tablet 180 tablet 0     Sig: TAKE 2 TABLETS BY MOUTH AT BEDTIME    There is no refill protocol information for this order          Last  OV: 6/2/2017    Prescription approved per [facility] Refill Protocol.    Kin Dunham RN, BSN

## 2017-12-12 DIAGNOSIS — F43.20 ADJUSTMENT DISORDER, UNSPECIFIED TYPE: ICD-10-CM

## 2017-12-13 NOTE — TELEPHONE ENCOUNTER
Requested Prescriptions   Pending Prescriptions Disp Refills     sertraline (ZOLOFT) 100 MG tablet [Pharmacy Med Name: SERTRALINE  MG TABLET] 60 tablet 0     Sig: TAKE 2 TABLETS (200 MG) BY MOUTH DAILY    SSRIs Protocol Failed    12/12/2017  5:20 PM       Failed - Recent or future visit with authorizing provider    Patient had office visit in the last year or has a visit in the next 30 days with authorizing provider.  See chart review.              Passed - Medication is NOT Bupropion    If the medication is Bupropion (Wellbutrin), and the patient is taking for smoking cessation; OK to refill.         Passed - Patient is age 18 or older

## 2017-12-19 NOTE — TELEPHONE ENCOUNTER
Looks like patient is seeing Dr. Browne now.  Will send to him for further review.    Logan Dewey MD

## 2017-12-20 RX ORDER — SERTRALINE HYDROCHLORIDE 100 MG/1
TABLET, FILM COATED ORAL
Qty: 60 TABLET | Refills: 0 | Status: SHIPPED | OUTPATIENT
Start: 2017-12-20 | End: 2017-12-27

## 2017-12-21 NOTE — PROGRESS NOTES
SUBJECTIVE:                                                    Jeffy Rogers is a 67 year old male who presents to clinic today for the following health issues:  Not taking nortriptyline - this didn't help with his pain or much with his sleep.  He resumed trazodone instead for his sleep.      PSA test-ins covers annually  Lung x-ray  How bad is his kidney?  Has atrophic left kidney.  Right kidney has looked good on imaging.  When is his next cardiology visit  3 month refills    History of Present Illness     CKD:     NSAID use::  Ibuprofen (Motrin)    Depression & Anxiety Follow-up:     Depression/Anxiety:  Depression only    Status since last visit::  Stable    Other associated symptoms of depression::  None    Significant life event::  No    Current substance use::  Cannabis    Anxiety/Panic symptoms::  No    Hyperlipidemia:     Low fat/chol diet rating::  Fair    Taking Statins::  YES    Lipid Medications or Supplements::  Fish oil/Omega 3, without side effects.    Hypertension:     Outpatient blood pressures:  Are being checked    Blood pressures checked at:  Home    Dietary sodium intake::  Not monitoring salt intake    Diet:  Regular (no restrictions)  Frequency of exercise:  4-5 days/week  Duration of exercise:  30-45 minutes  Taking medications regularly:  Yes  Medication side effects:  Not applicable  Additional concerns today:  YES    Mood has been pretty good.  Does continue to use marijuana.  Does have some family stress at this time.      PHQ-9 SCORE 12/5/2016 6/2/2017 12/27/2017   Total Score - - -   Total Score MyChart - - 3 (Minimal depression)   Total Score 8 5 3        Denies side effects from his medications.    Has cut down on his smoking to 1/4 ppd.  Was previously on 1.5 ppd.  Feels that Chantix is helping, but hasn't been able to fully quit yet.          Chronic Pain Follow-Up       Type / Location of Pain: right leg  Analgesia/pain control:       Recent changes:  same      Overall control:  Tolerable with discomfort-hard sleeping at night, aches more at night time  Thinks it has something to do with his back, when he pulls it up it doesn't hurt as much  Activity level/function:      Daily activities:  Able to do all daily activities    Work:  not applicable  Adverse effects:  No  Adherance    Taking medication as directed?  Yes    Participating in other treatments: no - just walking and it seems to be helping  Risk Factors:    Sleep:  Poor    Mood/anxiety:  controlled    Recent family or social stressors:  none noted    Other aggravating factors: prolonged standing and walk too long, sometimes hears a clicking-hip joint was checked not too long ago.  PHQ-9 SCORE 4/21/2016 12/5/2016 6/2/2017   Total Score - - -   Total Score 6 8 5     DEBRA-7 SCORE 10/28/2014 12/5/2016 6/2/2017   Total Score 0 - -   Total Score - 0 2     Encounter-Level CSA:     There are no encounter-level csa.        Advance Care Planning 12/27/2017: ACP Review of Chart / Resources Provided:  Reviewed chart for advance care plan.  Jeffy GIGI Rogers has been provided information and resources to begin or update their advance care plan.  Added by Jong Rasmussen    Has been doing some walking, which has helped a bit with the pain.  Did hear some clicking in his hip.  Still thinks it's primarily his back.  Most of the pain is still down the side of his leg.      Not improved with nortriptyline.  Didn't get benefit from gabapentin.          Problem list and histories reviewed & adjusted, as indicated.  Additional history: as documented      Current Outpatient Prescriptions   Medication Sig Dispense Refill     sertraline (ZOLOFT) 100 MG tablet TAKE 2 TABLETS (200 MG) BY MOUTH DAILY 60 tablet 0     traZODone (DESYREL) 100 MG tablet TAKE 2 TABLETS BY MOUTH AT BEDTIME 180 tablet 0     atorvastatin (LIPITOR) 80 MG tablet Take 1 tablet (80 mg) by mouth daily Appointment needed for additional refills. 90 tablet 0     metoprolol (TOPROL-XL) 50  MG 24 hr tablet TAKE 1 TABLET BY MOUTH EVERY DAY 90 tablet 2     lisinopril (PRINIVIL/ZESTRIL) 10 MG tablet Take 1 tablet (10 mg) by mouth daily 90 tablet 1     varenicline (CHANTIX) 1 MG tablet Take 1 tablet (1 mg) by mouth 2 times daily 56 tablet 2     cyclobenzaprine (FLEXERIL) 10 MG tablet Take 1 tablet (10 mg) by mouth 3 times daily 270 tablet 1     dutasteride (AVODART) 0.5 MG capsule TAKE 1 CAPSULE (0.5 MG) BY MOUTH DAILY 90 capsule 2     lidocaine (LIDODERM) 5 % Patch Apply up to 3 patches to painful area at once for up to 12 h within a 24 h period.  Remove after 12 hours. 30 patch 5     pantoprazole (PROTONIX) 40 MG EC tablet Take 1 tablet (40 mg) by mouth daily 90 tablet 3     CALCIUM CARBONATE-VIT D-MIN 0657-4230 MG-UNIT PO CHEW 1 TABLET THREE TIMES DAILY       VITAMIN D 1000 UNIT OR CAPS 1 CAPSULE DAILY 0 0     ASCORBIC ACID 1000 MG PO TABS 1 TABLET DAILY AT DINNER 30 0     MULTI-VITAMIN OR TABS 1 TABLET DAILY  0 0     ASPIRIN 81 MG OR TABS 1 TABLET DAILY 100 0     Recent Labs   Lab Test  06/02/17   1615  12/05/16   1601   11/20/15   1045   10/07/14   1443   04/21/11   1241  12/08/10   1202   LDL   --   76   --   51   --   62   < >  107  63   HDL   --   42   --   40   --   40*   < >  49  44   TRIG   --   147   --   111   --   107   < >  134  96   ALT  20   --    --    --    --    --    --   16  12   CR  1.17  1.09   < >  0.96   < >  1.46*   < >   --    --    GFRESTIMATED  62  68   < >  79   < >  49*   < >   --    --    GFRESTBLACK  76  82   < >  >90   GFR Calc     < >  59*   < >   --    --    POTASSIUM  5.1  4.1   < >  4.4   < >  4.8   < >   --    --    TSH  0.51  0.08*   --   0.50   < >  0.27*   < >   --    --     < > = values in this interval not displayed.      BP Readings from Last 3 Encounters:   12/27/17 146/76   06/02/17 134/68   12/05/16 110/70    Wt Readings from Last 3 Encounters:   12/27/17 179 lb 1.6 oz (81.2 kg)   06/02/17 172 lb 6.4 oz (78.2 kg)   12/05/16 163 lb (73.9 kg)  "              ROS:  Constitutional, HEENT, cardiovascular, pulmonary, gi and gu systems are negative, except as otherwise noted.      OBJECTIVE:   /76 (BP Location: Left arm, Patient Position: Chair, Cuff Size: Adult Regular)  Pulse 66  Temp 99.2  F (37.3  C) (Temporal)  Resp 12  Ht 5' 10\" (1.778 m)  Wt 179 lb 1.6 oz (81.2 kg)  SpO2 97%  BMI 25.7 kg/m2  Body mass index is 25.7 kg/(m^2).  GENERAL: healthy, alert and no distress  NECK: no adenopathy, no asymmetry, masses, or scars and thyroid normal to palpation  RESP: globally diminished lung sounds.    CV: regular rate and rhythm, normal S1 S2, no S3 or S4, no murmur, click or rub, no peripheral edema and peripheral pulses strong  ABDOMEN: soft, nontender, no hepatosplenomegaly, no masses and bowel sounds normal  MS: no gross musculoskeletal defects noted, no edema.  Does have worsening pain with compression in his pelvis.      Diagnostic Test Results:  Results for orders placed or performed in visit on 06/02/17   Hemoglobin   Result Value Ref Range    Hemoglobin 13.2 (L) 13.3 - 17.7 g/dL   TSH with free T4 reflex   Result Value Ref Range    TSH 0.51 0.40 - 4.00 mU/L   Comprehensive metabolic panel   Result Value Ref Range    Sodium 141 133 - 144 mmol/L    Potassium 5.1 3.4 - 5.3 mmol/L    Chloride 109 94 - 109 mmol/L    Carbon Dioxide 26 20 - 32 mmol/L    Anion Gap 6 3 - 14 mmol/L    Glucose 88 70 - 99 mg/dL    Urea Nitrogen 15 7 - 30 mg/dL    Creatinine 1.17 0.66 - 1.25 mg/dL    GFR Estimate 62 >60 mL/min/1.7m2    GFR Estimate If Black 76 >60 mL/min/1.7m2    Calcium 9.1 8.5 - 10.1 mg/dL    Bilirubin Total 0.2 0.2 - 1.3 mg/dL    Albumin 3.9 3.4 - 5.0 g/dL    Protein Total 7.2 6.8 - 8.8 g/dL    Alkaline Phosphatase 103 40 - 150 U/L    ALT 20 0 - 70 U/L    AST 14 0 - 45 U/L       ASSESSMENT/PLAN:     Tobacco Cessation:   reports that he has been smoking Cigarettes.  He has a 32.00 pack-year smoking history. He has never used smokeless tobacco.  Tobacco " Cessation Action Plan: Pharmacotherapies : Chantix          ICD-10-CM    1. Chronic pain of right lower extremity M79.604 lidocaine (LIDODERM) 5 % Patch    G89.29 cyclobenzaprine (FLEXERIL) 10 MG tablet     PAIN MANAGEMENT REFERRAL     Drug Abuse Screen Panel 13, Urine (Pain Care Package)     HYDROcodone-acetaminophen (NORCO) 5-325 MG per tablet   2. Meralgia paresthetica of right side G57.11 PAIN MANAGEMENT REFERRAL     HYDROcodone-acetaminophen (NORCO) 5-325 MG per tablet   3. Adjustment disorder, unspecified type F43.20 sertraline (ZOLOFT) 100 MG tablet   4. BPH with urinary obstruction N40.1 PSA, screen    N13.8 dutasteride (AVODART) 0.5 MG capsule   5. Chronic kidney disease, stage 2 (mild) N18.2 Albumin Random Urine Quantitative with Creat Ratio   6. Insomnia due to medical condition G47.01 traZODone (DESYREL) 100 MG tablet   7. Hyperlipidemia LDL goal <100 E78.5 Lipid panel reflex to direct LDL Fasting     atorvastatin (LIPITOR) 80 MG tablet   8. Hypertension goal BP (blood pressure) < 140/90 I10 metoprolol (TOPROL-XL) 50 MG 24 hr tablet     lisinopril (PRINIVIL/ZESTRIL) 10 MG tablet   9. Chronic right-sided low back pain with right-sided sciatica M54.41 cyclobenzaprine (FLEXERIL) 10 MG tablet    G89.29 Drug Abuse Screen Panel 13, Urine (Pain Care Package)   10. Nerve pain M79.2 cyclobenzaprine (FLEXERIL) 10 MG tablet   11. Gastroesophageal reflux disease without esophagitis K21.9 pantoprazole (PROTONIX) 40 MG EC tablet   12. Tobacco use disorder F17.200 varenicline (CHANTIX) 1 MG tablet     1,2,9,10.  After reviewing all his past imaging workup for his chronic pain and after examining him today, I am suspicious that his pain is actually an atypical presentation of meralgia paresthetica.  After describing this to patient, he felt this was likely the cause.  Discussed possible therapeutic and diagnostic nerve block of the lateral femoral cutaneous nerve.  He wished to proceed with this referral was placed pain  management to obtain a nerve block.  He requested additional pain medication for occasional use when his pain is more severe.  I discussed that I am not willing to do a long-term prescription for narcotics given his illegal substance use.  Will obtain a urine drug screen today, and give a small amount of hydrocodone to be used sparingly.  If he needs long-term pain control for this, will need to try other nerve stabilizing agents, even though he is failed nortriptyline and gabapentin.  His pain does not appear to be related to hip arthritis, and does not fully correlate well to the findings on his MRI for his low back abnormalities.  3.  Much improved.  Will continue his current dose of sertraline.  4.  Not fully controlled.  Patient previously had his Flomax stopped.  He thinks his symptoms may have slightly worsened since then, but he is not interested in resuming another medication for this as the symptoms are still relatively mild.  5.  Check urine for possible proteinuria.  Reviewed his recent labs with patient.  He appears to be doing quite well in regards to kidney function overall.  Will continue risk factor reduction and follow his renal function over time.  6.  Currently Controlled.  Continue current regimen.  Call/return if any problems or questions arise.   7.  Currently Controlled.  Continue current regimen.  Call/return if any problems or questions arise.   8.  Not fully controlled.  We will have him return for repeat BP.    11.  For control.  Continue current regimen.  12.  Has decreased his cigarette use.  Will continue Chantix for now.  Discussed that he needs to continue reduction and hopefully cease use of tobacco.    Portions of this note were completed using Dragon dictation software.  Although reviewed, there may be typographical and other inadvertent errors that remain.                     Patient Instructions   Thank you for visiting [facility]    Let's see if an injection  block of the lateral femoral cutaneous nerve helps with your symptoms.      Use Lidoderm and/or tylenol for acute pain flares.    Please return for fasting labs tomorrow.    We'll let you know your lab results as soon as we can.     Please see me in 3-6 months for follow up.     Keep working on quitting smoking.    If you had imaging scheduled please refer to your radiology prep sheet.    Appointment    Date_______________     Time_____________    Day:   M TU W TH F    With____________________________    Location_________________________    If you need medication refills, please contact your pharmacy 3 days before your prescriptions runs out. If you are out of refills, your pharmacy will contact contact the clinic.    Contact us or return if questions or concerns.     -Your Care Team:  MD Marlys Jeffrey PA-C Joel De Haan, PA-C Elizabeth McLean, APRN CNP    General information about your clinic      Clinic hours:     Lab hours:  Phone [phone]  Monday 7:30 am-7 pm    Monday 8:30 am-6:30 pm  Tuesday-Friday 7:30 am-5 pm   Tuesday-Friday 8:30 am-4:30 pm    Pharmacy hours:  Phone [phone]  Monday 8:30 am-7pm  Tuesday-Friday 8:30am-6 pm                                       Mychart assistance [phone]        We would like to hear from you, how was your visit today?    Karla Dobbs  Patient Information Supervisor   Patient Care Supervisor  [facility], and [facility], and [facility]  [phone]         Benton Browne MD, MD  [facility]

## 2017-12-27 ENCOUNTER — OFFICE VISIT (OUTPATIENT)
Dept: FAMILY MEDICINE | Facility: OTHER | Age: 67
End: 2017-12-27
Payer: COMMERCIAL

## 2017-12-27 VITALS
RESPIRATION RATE: 12 BRPM | WEIGHT: 179.1 LBS | HEIGHT: 70 IN | BODY MASS INDEX: 25.64 KG/M2 | TEMPERATURE: 99.2 F | SYSTOLIC BLOOD PRESSURE: 146 MMHG | OXYGEN SATURATION: 97 % | HEART RATE: 66 BPM | DIASTOLIC BLOOD PRESSURE: 76 MMHG

## 2017-12-27 DIAGNOSIS — E78.5 HYPERLIPIDEMIA LDL GOAL <100: ICD-10-CM

## 2017-12-27 DIAGNOSIS — N13.8 BPH WITH URINARY OBSTRUCTION: ICD-10-CM

## 2017-12-27 DIAGNOSIS — N40.1 BPH WITH URINARY OBSTRUCTION: ICD-10-CM

## 2017-12-27 DIAGNOSIS — G57.11 MERALGIA PARESTHETICA OF RIGHT SIDE: ICD-10-CM

## 2017-12-27 DIAGNOSIS — M79.604 CHRONIC PAIN OF RIGHT LOWER EXTREMITY: Primary | ICD-10-CM

## 2017-12-27 DIAGNOSIS — K21.9 GASTROESOPHAGEAL REFLUX DISEASE WITHOUT ESOPHAGITIS: ICD-10-CM

## 2017-12-27 DIAGNOSIS — N18.2 CHRONIC KIDNEY DISEASE, STAGE 2 (MILD): ICD-10-CM

## 2017-12-27 DIAGNOSIS — F32.5 MAJOR DEPRESSION IN COMPLETE REMISSION (H): ICD-10-CM

## 2017-12-27 DIAGNOSIS — G89.29 CHRONIC RIGHT-SIDED LOW BACK PAIN WITH RIGHT-SIDED SCIATICA: ICD-10-CM

## 2017-12-27 DIAGNOSIS — G89.29 CHRONIC PAIN OF RIGHT LOWER EXTREMITY: Primary | ICD-10-CM

## 2017-12-27 DIAGNOSIS — I10 HYPERTENSION GOAL BP (BLOOD PRESSURE) < 140/90: ICD-10-CM

## 2017-12-27 DIAGNOSIS — M54.41 CHRONIC RIGHT-SIDED LOW BACK PAIN WITH RIGHT-SIDED SCIATICA: ICD-10-CM

## 2017-12-27 DIAGNOSIS — M79.2 NERVE PAIN: ICD-10-CM

## 2017-12-27 DIAGNOSIS — F17.200 TOBACCO USE DISORDER: ICD-10-CM

## 2017-12-27 DIAGNOSIS — G47.01 INSOMNIA DUE TO MEDICAL CONDITION: ICD-10-CM

## 2017-12-27 LAB
AMPHETAMINES UR QL: NOT DETECTED NG/ML
BARBITURATES UR QL SCN: NOT DETECTED NG/ML
BENZODIAZ UR QL SCN: NOT DETECTED NG/ML
BUPRENORPHINE UR QL: NOT DETECTED NG/ML
CANNABINOIDS UR QL: ABNORMAL NG/ML
COCAINE UR QL SCN: NOT DETECTED NG/ML
CREAT UR-MCNC: 23 MG/DL
D-METHAMPHET UR QL: NOT DETECTED NG/ML
METHADONE UR QL SCN: NOT DETECTED NG/ML
MICROALBUMIN UR-MCNC: <5 MG/L
MICROALBUMIN/CREAT UR: NORMAL MG/G CR (ref 0–17)
OPIATES UR QL SCN: NOT DETECTED NG/ML
OXYCODONE UR QL SCN: NOT DETECTED NG/ML
PCP UR QL SCN: NOT DETECTED NG/ML
PROPOXYPH UR QL: NOT DETECTED NG/ML
TRICYCLICS UR QL SCN: NOT DETECTED NG/ML

## 2017-12-27 PROCEDURE — 99214 OFFICE O/P EST MOD 30 MIN: CPT | Performed by: FAMILY MEDICINE

## 2017-12-27 PROCEDURE — 80306 DRUG TEST PRSMV INSTRMNT: CPT | Performed by: FAMILY MEDICINE

## 2017-12-27 PROCEDURE — 82043 UR ALBUMIN QUANTITATIVE: CPT | Performed by: FAMILY MEDICINE

## 2017-12-27 RX ORDER — LISINOPRIL 10 MG/1
10 TABLET ORAL DAILY
Qty: 90 TABLET | Refills: 1 | Status: SHIPPED | OUTPATIENT
Start: 2017-12-27 | End: 2018-08-09

## 2017-12-27 RX ORDER — METOPROLOL SUCCINATE 50 MG/1
50 TABLET, EXTENDED RELEASE ORAL DAILY
Qty: 90 TABLET | Refills: 2 | Status: SHIPPED | OUTPATIENT
Start: 2017-12-27 | End: 2019-03-21

## 2017-12-27 RX ORDER — ATORVASTATIN CALCIUM 80 MG/1
80 TABLET, FILM COATED ORAL DAILY
Qty: 90 TABLET | Refills: 0 | Status: SHIPPED | OUTPATIENT
Start: 2017-12-27 | End: 2018-04-02

## 2017-12-27 RX ORDER — DUTASTERIDE 0.5 MG/1
CAPSULE, LIQUID FILLED ORAL
Qty: 90 CAPSULE | Refills: 3 | Status: SHIPPED | OUTPATIENT
Start: 2017-12-27 | End: 2018-09-14

## 2017-12-27 RX ORDER — LIDOCAINE 50 MG/G
PATCH TOPICAL
Qty: 90 PATCH | Refills: 3 | Status: SHIPPED | OUTPATIENT
Start: 2017-12-27 | End: 2019-04-09

## 2017-12-27 RX ORDER — CYCLOBENZAPRINE HCL 10 MG
10 TABLET ORAL 3 TIMES DAILY
Qty: 270 TABLET | Refills: 1 | Status: SHIPPED | OUTPATIENT
Start: 2017-12-27 | End: 2018-06-27

## 2017-12-27 RX ORDER — PANTOPRAZOLE SODIUM 40 MG/1
40 TABLET, DELAYED RELEASE ORAL DAILY
Qty: 90 TABLET | Refills: 3 | Status: SHIPPED | OUTPATIENT
Start: 2017-12-27 | End: 2018-09-14

## 2017-12-27 RX ORDER — VARENICLINE TARTRATE 1 MG/1
1 TABLET, FILM COATED ORAL 2 TIMES DAILY
Qty: 56 TABLET | Refills: 2 | Status: SHIPPED | OUTPATIENT
Start: 2017-12-27 | End: 2018-06-27

## 2017-12-27 RX ORDER — HYDROCODONE BITARTRATE AND ACETAMINOPHEN 5; 325 MG/1; MG/1
.5-1 TABLET ORAL EVERY 8 HOURS PRN
Qty: 20 TABLET | Refills: 0 | Status: SHIPPED | OUTPATIENT
Start: 2017-12-27 | End: 2018-03-28

## 2017-12-27 RX ORDER — TRAZODONE HYDROCHLORIDE 100 MG/1
TABLET ORAL
Qty: 180 TABLET | Refills: 0 | Status: SHIPPED | OUTPATIENT
Start: 2017-12-27 | End: 2018-06-21

## 2017-12-27 RX ORDER — SERTRALINE HYDROCHLORIDE 100 MG/1
TABLET, FILM COATED ORAL
Qty: 180 TABLET | Refills: 3 | Status: SHIPPED | OUTPATIENT
Start: 2017-12-27 | End: 2019-01-04

## 2017-12-27 ASSESSMENT — ANXIETY QUESTIONNAIRES
2. NOT BEING ABLE TO STOP OR CONTROL WORRYING: NOT AT ALL
GAD7 TOTAL SCORE: 0
GAD7 TOTAL SCORE: 0
4. TROUBLE RELAXING: NOT AT ALL
7. FEELING AFRAID AS IF SOMETHING AWFUL MIGHT HAPPEN: NOT AT ALL
1. FEELING NERVOUS, ANXIOUS, OR ON EDGE: NOT AT ALL
5. BEING SO RESTLESS THAT IT IS HARD TO SIT STILL: NOT AT ALL
3. WORRYING TOO MUCH ABOUT DIFFERENT THINGS: NOT AT ALL
6. BECOMING EASILY ANNOYED OR IRRITABLE: NOT AT ALL
7. FEELING AFRAID AS IF SOMETHING AWFUL MIGHT HAPPEN: NOT AT ALL
GAD7 TOTAL SCORE: 0

## 2017-12-27 ASSESSMENT — PAIN SCALES - GENERAL: PAINLEVEL: MODERATE PAIN (5)

## 2017-12-27 ASSESSMENT — PATIENT HEALTH QUESTIONNAIRE - PHQ9
10. IF YOU CHECKED OFF ANY PROBLEMS, HOW DIFFICULT HAVE THESE PROBLEMS MADE IT FOR YOU TO DO YOUR WORK, TAKE CARE OF THINGS AT HOME, OR GET ALONG WITH OTHER PEOPLE: NOT DIFFICULT AT ALL
SUM OF ALL RESPONSES TO PHQ QUESTIONS 1-9: 3
SUM OF ALL RESPONSES TO PHQ QUESTIONS 1-9: 3

## 2017-12-27 NOTE — PATIENT INSTRUCTIONS
Thank you for visiting [facility]    Let's see if an injection block of the lateral femoral cutaneous nerve helps with your symptoms.      Use Lidoderm and/or tylenol for acute pain flares.    Please return for fasting labs tomorrow.    We'll let you know your lab results as soon as we can.     Please see me in 3-6 months for follow up.     Keep working on quitting smoking.    If you had imaging scheduled please refer to your radiology prep sheet.    Appointment    Date_______________     Time_____________    Day:   M TU W TH F    With____________________________    Location_________________________    If you need medication refills, please contact your pharmacy 3 days before your prescriptions runs out. If you are out of refills, your pharmacy will contact contact the clinic.    Contact us or return if questions or concerns.     -Your Care Team:  MD Marlys Jeffrey PA-C Joel De Haan, PA-C Elizabeth McLean, APRN CNP    General information about your clinic      Clinic hours:     Lab hours:  Phone [phone]  Monday 7:30 am-7 pm    Monday 8:30 am-6:30 pm  Tuesday-Friday 7:30 am-5 pm   Tuesday-Friday 8:30 am-4:30 pm    Pharmacy hours:  Phone [phone]  Monday 8:30 am-7pm  Tuesday-Friday 8:30am-6 pm                                       Mychart assistance [phone]        We would like to hear from you, how was your visit today?    Karla Dobbs  Patient Information Supervisor   Patient Care Supervisor  [facility], and [facility], and [facility]  [phone]

## 2017-12-27 NOTE — NURSING NOTE
"Chief Complaint   Patient presents with     Lipids     Depression     Hypertension     Pain     Panel Management     Honoring Choices, lipid, PHQ, Microalbumin, Urine drug screen       Initial /76 (BP Location: Left arm, Patient Position: Chair, Cuff Size: Adult Regular)  Pulse 66  Temp 99.2  F (37.3  C) (Temporal)  Resp 12  Ht 5' 10\" (1.778 m)  Wt 179 lb 1.6 oz (81.2 kg)  SpO2 97%  BMI 25.7 kg/m2 Estimated body mass index is 25.7 kg/(m^2) as calculated from the following:    Height as of this encounter: 5' 10\" (1.778 m).    Weight as of this encounter: 179 lb 1.6 oz (81.2 kg).  Medication Reconciliation: complete  Jong Rasmussen, CMA    "

## 2017-12-27 NOTE — MR AVS SNAPSHOT
After Visit Summary   12/27/2017    Jeffy Rogers    MRN: [id number]           Patient Information     Date Of Birth          1950        Visit Information        Provider Department      12/27/2017 11:30 AM Benton Browne MD [facility]        Today's Diagnoses     Chronic kidney disease, stage 2 (mild)    -  1    Chronic pain of right lower extremity        Meralgia paresthetica of right side        Tobacco use disorder        Adjustment disorder, unspecified type        Insomnia due to medical condition        Hyperlipidemia LDL goal <100        Hypertension goal BP (blood pressure) < 140/90        Chronic right-sided low back pain with right-sided sciatica        Nerve pain        BPH with urinary obstruction        Gastroesophageal reflux disease without esophagitis          Care Instructions    Thank you for visiting [facility]    Let's see if an injection block of the lateral femoral cutaneous nerve helps with your symptoms.      Use Lidoderm and/or tylenol for acute pain flares.    Please return for fasting labs tomorrow.    We'll let you know your lab results as soon as we can.     Please see me in 3-6 months for follow up.     Keep working on quitting smoking.    If you had imaging scheduled please refer to your radiology prep sheet.    Appointment    Date_______________     Time_____________    Day:   M TU W TH F    With____________________________    Location_________________________    If you need medication refills, please contact your pharmacy 3 days before your prescriptions runs out. If you are out of refills, your pharmacy will contact contact the clinic.    Contact us or return if questions or concerns.     -Your Care Team:  MD Marlys Jeffrey PA-C Joel De Haan, PA-C Elizabeth McLean, YUE CALVIN    General information about your clinic      Clinic hours:     Lab hours:  Phone [phone]  Monday 7:30 am-7  pm    Monday 8:30 am-6:30 pm  Tuesday-Friday 7:30 am-5 pm   Tuesday-Friday 8:30 am-4:30 pm    Pharmacy hours:  Phone [phone]  Monday 8:30 am-7pm  Tuesday-Friday 8:30am-6 pm                                       Fernando whaley [phone]        We would like to hear from you, how was your visit today?    Karla Dobbs  Patient Information Supervisor   Patient Care Supervisor  [facility], and [facility], and [facility]  [phone]             Follow-ups after your visit        Additional Services     PAIN MANAGEMENT REFERRAL       Your provider has referred you to: N: [facility] [phone]   [email or web address]      Please call clinic directly to schedule appointment.    **ANY DIAGNOSTIC TESTS THAT ARE NOT IN EPIC SHOULD BE SENT TO THE PAIN CENTER**    REGARDING OPIOID MEDICATIONS:  We will always address appropriateness of opioid pain medications, but we generally will not automatically take on a prescribing role. When we do take on prescribing of opioids for chronic pain, it is in collaboration with the referring physician for an intermediate period of time (months), with an expectation that the primary physician or provider will assume the prescribing role if medications are effective at stable doses with demonstrated compliance.  Therefore, please do not assume that your prescribing responsibilities end on the day of pain clinic consultation.  Is this agreeable to you? YES    Please be aware that coverage of these services is subject to the terms and limitations of your health insurance plan.  Call member services at your health plan with any benefit or coverage questions.      Please bring the following with you to your appointment:    (1) Any X-Rays, CTs or MRIs which have been performed.   "Contact the facility where they were done to arrange for  prior to your scheduled appointment.    (2) List of current medications   (3) This referral request   (4) Any documents/labs given to you for this referral                  Future tests that were ordered for you today     Open Future Orders        Priority Expected Expires Ordered    Lipid panel reflex to direct LDL Fasting Routine  12/27/2018 12/27/2017    PSA, screen Routine  12/27/2018 12/27/2017            Who to contact     If you have questions or need follow up information about today's clinic visit or your schedule please contact [facility] directly at [phone].  Normal or non-critical lab and imaging results will be communicated to you by [employer], letter or phone within 4 business days after the clinic has received the results. If you do not hear from us within 7 days, please contact the clinic through [employer] or phone. If you have a critical or abnormal lab result, we will notify you by phone as soon as possible.  Submit refill requests through [employer] or call your pharmacy and they will forward the refill request to us. Please allow 3 business days for your refill to be completed.          Additional Information About Your Visit        [employer] Information     [employer] gives you secure access to your electronic health record. If you see a primary care provider, you can also send messages to your care team and make appointments. If you have questions, please call your primary care clinic.  If you do not have a primary care provider, please call [phone] and they will assist you.        Care EveryWhere ID     This is your Care EveryWhere ID. This could be used by other organizations to access your [place] medical records  [id number]        Your Vitals Were     Pulse Temperature Respirations Height Pulse Oximetry BMI (Body Mass Index)    66 99.2  F (37.3  C) (Temporal) 12 5' 10\" (1.778 m) 97% 25.7 kg/m2       Blood " Pressure from Last 3 Encounters:   12/27/17 146/76   06/02/17 134/68   12/05/16 110/70    Weight from Last 3 Encounters:   12/27/17 179 lb 1.6 oz (81.2 kg)   06/02/17 172 lb 6.4 oz (78.2 kg)   12/05/16 163 lb (73.9 kg)              We Performed the Following     Albumin Random Urine Quantitative with Creat Ratio     Drug Abuse Screen Panel 13, Urine (Pain Care Package)     PAIN MANAGEMENT REFERRAL          Today's Medication Changes          These changes are accurate as of: 12/27/17 12:28 PM.  If you have any questions, ask your nurse or doctor.               Start taking these medicines.        Dose/Directions    HYDROcodone-acetaminophen 5-325 MG per tablet   Commonly known as:  NORCO   Used for:  Chronic pain of right lower extremity, Meralgia paresthetica of right side   Started by:  Benton Browne MD        Dose:  0.5-1 tablet   Take 0.5-1 tablets by mouth every 8 hours as needed for pain   Quantity:  20 tablet   Refills:  0         These medicines have changed or have updated prescriptions.        Dose/Directions    dutasteride 0.5 MG capsule   Commonly known as:  AVODART   This may have changed:  See the new instructions.   Used for:  BPH with urinary obstruction   Changed by:  Benton Browne MD        TAKE 1 CAPSULE (0.5 MG) BY MOUTH DAILY   Quantity:  90 capsule   Refills:  3       metoprolol 50 MG 24 hr tablet   Commonly known as:  TOPROL-XL   This may have changed:  See the new instructions.   Used for:  Hypertension goal BP (blood pressure) < 140/90   Changed by:  Benton Browne MD        Dose:  50 mg   Take 1 tablet (50 mg) by mouth daily   Quantity:  90 tablet   Refills:  2       sertraline 100 MG tablet   Commonly known as:  ZOLOFT   This may have changed:  See the new instructions.   Used for:  Adjustment disorder, unspecified type   Changed by:  Benton Browne MD        TAKE 2 TABLETS (200 MG) BY MOUTH DAILY   Quantity:  180 tablet   Refills:  3         Stop taking  these medicines if you haven't already. Please contact your care team if you have questions.     nortriptyline 10 MG capsule   Commonly known as:  PAMELOR   Stopped by:  Benton Browne MD                Where to get your medicines      These medications were sent to [facility]/pharmacy #[id number] - [place] - [address]  [address] [place] [address]     Phone:  [phone]     atorvastatin 80 MG tablet    cyclobenzaprine 10 MG tablet    dutasteride 0.5 MG capsule    lidocaine 5 % Patch    lisinopril 10 MG tablet    metoprolol 50 MG 24 hr tablet    pantoprazole 40 MG EC tablet    sertraline 100 MG tablet    traZODone 100 MG tablet         These medications were sent to [facility] - [place] - [address]   [address] [place] [address]     Phone:  [phone]     varenicline 1 MG tablet         Some of these will need a paper prescription and others can be bought over the counter.  Ask your nurse if you have questions.     Bring a paper prescription for each of these medications     HYDROcodone-acetaminophen 5-325 MG per tablet                Primary Care Provider Office Phone # Fax #    Logan Dewey [phone]       [address] [place] [address]        Equal Access to Services     [facility] CenterJOHNATHAN AH: Hadii aad ku hadasho Soomaali, waaxda luqadaha, qaybta kaalmada adeegyada, waxay idiin hayaan adeeg kharash la'rock leal. So [facility] [phone].    ATENCIÓN: Si habla español, tiene a redman disposición servicios gratuitos de asistencia lingüística. Llame al [phone].    We comply with applicable federal civil rights laws and [place] laws. We do not discriminate on the basis of race, color, national origin, age, disability, sex, sexual orientation, or gender identity.            Thank you!     Thank you for choosing [facility]  for your care. Our goal is always to provide you with excellent  care. Hearing back from our patients is one way we can continue to improve our services. Please take a few minutes to complete the written survey that you may receive in the mail after your visit with us. Thank you!             Your Updated Medication List - Protect others around you: Learn how to safely use, store and throw away your medicines at www.disposemymeds.org.          This list is accurate as of: 12/27/17 12:28 PM.  Always use your most recent med list.                   Brand Name Dispense Instructions for use Diagnosis    ascorbic acid 1000 MG Tabs tablet     30    1 TABLET DAILY AT DINNER        aspirin 81 MG tablet     100    1 TABLET DAILY    Essential hypertension, benign       atorvastatin 80 MG tablet    LIPITOR    90 tablet    Take 1 tablet (80 mg) by mouth daily Appointment needed for additional refills.    Hyperlipidemia LDL goal <100       calcium carbonate-Vit D-Min 9159-5104 MG-UNIT Chew      1 TABLET THREE TIMES DAILY        cyclobenzaprine 10 MG tablet    FLEXERIL    270 tablet    Take 1 tablet (10 mg) by mouth 3 times daily    Chronic right-sided low back pain with right-sided sciatica, Chronic pain of right lower extremity, Nerve pain       dutasteride 0.5 MG capsule    AVODART    90 capsule    TAKE 1 CAPSULE (0.5 MG) BY MOUTH DAILY    BPH with urinary obstruction       HYDROcodone-acetaminophen 5-325 MG per tablet    NORCO    20 tablet    Take 0.5-1 tablets by mouth every 8 hours as needed for pain    Chronic pain of right lower extremity, Meralgia paresthetica of right side       lidocaine 5 % Patch    LIDODERM    90 patch    Apply up to 3 patches to painful area at once for up to 12 h within a 24 h period.  Remove after 12 hours.    Chronic pain of right lower extremity       lisinopril 10 MG tablet    PRINIVIL/ZESTRIL    90 tablet    Take 1 tablet (10 mg) by mouth daily    Hypertension goal BP (blood pressure) < 140/90       metoprolol 50 MG 24 hr tablet    TOPROL-XL    90 tablet     Take 1 tablet (50 mg) by mouth daily    Hypertension goal BP (blood pressure) < 140/90       Multi-vitamin Tabs tablet   Generic drug:  multivitamin, therapeutic with minerals     0    1 TABLET DAILY        pantoprazole 40 MG EC tablet    PROTONIX    90 tablet    Take 1 tablet (40 mg) by mouth daily    Gastroesophageal reflux disease without esophagitis       sertraline 100 MG tablet    ZOLOFT    180 tablet    TAKE 2 TABLETS (200 MG) BY MOUTH DAILY    Adjustment disorder, unspecified type       traZODone 100 MG tablet    DESYREL    180 tablet    TAKE 2 TABLETS BY MOUTH AT BEDTIME    Insomnia due to medical condition       varenicline 1 MG tablet    CHANTIX    56 tablet    Take 1 tablet (1 mg) by mouth 2 times daily    Tobacco use disorder       vitamin D 1000 UNITS capsule     0    1 CAPSULE DAILY

## 2017-12-28 DIAGNOSIS — N40.1 BPH WITH URINARY OBSTRUCTION: ICD-10-CM

## 2017-12-28 DIAGNOSIS — E78.5 HYPERLIPIDEMIA LDL GOAL <100: ICD-10-CM

## 2017-12-28 DIAGNOSIS — N13.8 BPH WITH URINARY OBSTRUCTION: ICD-10-CM

## 2017-12-28 LAB
CHOLEST SERPL-MCNC: 132 MG/DL
HDLC SERPL-MCNC: 39 MG/DL
LDLC SERPL CALC-MCNC: 75 MG/DL
NONHDLC SERPL-MCNC: 93 MG/DL
PSA SERPL-ACNC: 0.54 UG/L (ref 0–4)
TRIGL SERPL-MCNC: 90 MG/DL

## 2017-12-28 PROCEDURE — G0103 PSA SCREENING: HCPCS | Performed by: FAMILY MEDICINE

## 2017-12-28 PROCEDURE — 36415 COLL VENOUS BLD VENIPUNCTURE: CPT | Performed by: FAMILY MEDICINE

## 2017-12-28 PROCEDURE — 80061 LIPID PANEL: CPT | Performed by: FAMILY MEDICINE

## 2017-12-28 ASSESSMENT — ANXIETY QUESTIONNAIRES: GAD7 TOTAL SCORE: 0

## 2017-12-28 ASSESSMENT — PATIENT HEALTH QUESTIONNAIRE - PHQ9: SUM OF ALL RESPONSES TO PHQ QUESTIONS 1-9: 3

## 2017-12-28 NOTE — PROGRESS NOTES
Jeffy,    All of your labs were normal for you.    Have a nice day!    Dr. Browne    The 10-year ASCVD risk score (Georgiacindi FERNANDO Jr, et al., 2013) is: 22.9%    Values used to calculate the score:      Age: 67 years      Sex: Male      Is Non- : No      Diabetic: No      Tobacco smoker: Yes      Systolic Blood Pressure: 146 mmHg      Is BP treated: Yes      HDL Cholesterol: 39 mg/dL      Total Cholesterol: 132 mg/dL

## 2018-01-09 ENCOUNTER — TELEPHONE (OUTPATIENT)
Dept: FAMILY MEDICINE | Facility: OTHER | Age: 68
End: 2018-01-09

## 2018-01-09 NOTE — TELEPHONE ENCOUNTER
Referral and records have been faxed to [email or web address] in [place] fax#[phone], patient has been informed of this and states he will call in an hour to schedule with them  Closing encounter  Ilda ENRIQUEZ (R)

## 2018-01-09 NOTE — TELEPHONE ENCOUNTER
Reason for Call:  Other referral    Detailed comments: patient states he should have a referral to a pain clinic but he called the one in [place] and they didn't have a referral for him. Please call and let patient know if it was to be at the [place] office and if so they didn't get the referral.    Phone Number Patient can be reached at:    Telephone Information:   Mobile [phone]       Best Time: anytime    Can we leave a detailed message on this number? YES    Call taken on 1/9/2018 at 9:53 AM by Zulma Ford

## 2018-01-15 ENCOUNTER — TELEPHONE (OUTPATIENT)
Dept: FAMILY MEDICINE | Facility: OTHER | Age: 68
End: 2018-01-15

## 2018-01-24 ENCOUNTER — TRANSFERRED RECORDS (OUTPATIENT)
Dept: HEALTH INFORMATION MANAGEMENT | Facility: CLINIC | Age: 68
End: 2018-01-24

## 2018-02-07 ENCOUNTER — TRANSFERRED RECORDS (OUTPATIENT)
Dept: HEALTH INFORMATION MANAGEMENT | Facility: CLINIC | Age: 68
End: 2018-02-07

## 2018-02-14 DIAGNOSIS — G47.01 INSOMNIA DUE TO MEDICAL CONDITION: ICD-10-CM

## 2018-02-14 RX ORDER — TRAZODONE HYDROCHLORIDE 100 MG/1
TABLET ORAL
Qty: 180 TABLET | Refills: 0 | Status: SHIPPED | OUTPATIENT
Start: 2018-02-14 | End: 2018-05-22

## 2018-02-14 NOTE — TELEPHONE ENCOUNTER
Trazodone:  Prescription approved per [facility].  Due for follow up in May.     Sofia Newsome, RN, BSN

## 2018-03-28 ENCOUNTER — OFFICE VISIT (OUTPATIENT)
Dept: PODIATRY | Facility: CLINIC | Age: 68
End: 2018-03-28
Payer: COMMERCIAL

## 2018-03-28 VITALS
SYSTOLIC BLOOD PRESSURE: 130 MMHG | BODY MASS INDEX: 24.62 KG/M2 | WEIGHT: 172 LBS | DIASTOLIC BLOOD PRESSURE: 78 MMHG | HEIGHT: 70 IN | TEMPERATURE: 98.6 F

## 2018-03-28 DIAGNOSIS — L60.0 INGROWING NAIL: Primary | ICD-10-CM

## 2018-03-28 DIAGNOSIS — M79.604 CHRONIC PAIN OF RIGHT LOWER EXTREMITY: ICD-10-CM

## 2018-03-28 DIAGNOSIS — G89.29 CHRONIC PAIN OF RIGHT LOWER EXTREMITY: ICD-10-CM

## 2018-03-28 PROCEDURE — 99203 OFFICE O/P NEW LOW 30 MIN: CPT | Performed by: PODIATRIST

## 2018-03-28 RX ORDER — HYDROCODONE BITARTRATE AND ACETAMINOPHEN 5; 325 MG/1; MG/1
.5-1 TABLET ORAL EVERY 8 HOURS PRN
Qty: 20 TABLET | Refills: 0 | Status: SHIPPED | OUTPATIENT
Start: 2018-03-28 | End: 2018-06-27

## 2018-03-28 ASSESSMENT — PAIN SCALES - GENERAL: PAINLEVEL: SEVERE PAIN (7)

## 2018-03-28 NOTE — Clinical Note
3/28/2018         RE: Jeffy Rogers  [id number]  [place] [address]        Dear Colleague,    Thank you for referring your patient, Jeffy Rogers, to the [facility]. Please see a copy of my visit note below.    HPI:  Jeffy Rogers is a 67 year old male who is seen in consultation at the request of self.    Pt presents for eval of:   (Onset, Location, L/R, Character, Treatments, Injury if yes)     Onset Jan 2018, pain under Left great toenail. No injury noted.   Constant, stabbing, throbbing, redness, pain 7    Retired and play in a band.    BMI is Normal.    Patient to follow up with Primary Care provider regarding elevated blood pressure.    ROS:  10 point ROS neg other than the symptoms noted above in the HPI.    PAST MEDICAL HISTORY:   Past Medical History:   Diagnosis Date     Acute kidney failure, unspecified 07/04/2006    Secondary to medication effect.     Depressive disorder, not elsewhere classified      Depressive disorder, not elsewhere classified 04/18/11    D/C 04/20/11-[facility]     Essential hypertension, benign 6/28/2006     Hyperlipidaemia      MRSA (methicillin resistant Staphylococcus aureus)      PAD (peripheral artery disease) (H)      Pneumonia, organism unspecified(486) 8/30/10     Right inguinal hernia 3/4/2009     Syncope and collapse 07/04/2006    Syncopal episodes secondary to hypotension secondary to medication effect.     Tobacco use disorder 2/27/2002     Unspecified hypothyroidism 3/10/2003        PAST SURGICAL HISTORY:   Past Surgical History:   Procedure Laterality Date     ABDOMEN SURGERY       ANGIOGRAM       aortic iliac renal bypass  2000     BACK SURGERY       C APPENDECTOMY  1969     C NONSPECIFIC PROCEDURE  08/1999    left common iliac angioplasty and stent placement     C NONSPECIFIC PROCEDURE  11/2000    aortal femoral bypass and repair of renal artery stenosis     C VEIN IN SITU BYPASS GRAFT,FEM-POP  04/07/2003    Right  femoral to above knee popliteal area bypass with 8 mm Dacron graft. Aortogram with runoff.     ESOPHAGOSCOPY, GASTROSCOPY, DUODENOSCOPY (EGD), COMBINED  11/23/2010    COMBINED ESOPHAGOSCOPY, GASTROSCOPY, DUODENOSCOPY (EGD), BIOPSY SINGLE OR MULTIPLE performed by MADDY SANCHEZ at  GI     ESOPHAGOSCOPY, GASTROSCOPY, DUODENOSCOPY (EGD), COMBINED N/A 12/18/2015    Procedure: COMBINED ESOPHAGOSCOPY, GASTROSCOPY, DUODENOSCOPY (EGD), BIOPSY SINGLE OR MULTIPLE;  Surgeon: Benton Gutiérrez MD;  Location:  GI     HC COLONOSCOPY W BIOPSY  03/10/08     HC REMOVE TONSILS/ADENOIDS,<11 Y/O      unsure of age     HC UGI ENDOSCOPY, SIMPLE EXAM  10/13/09     HERNIA REPAIR, INGUINAL RT/LT  03/30/09    Right     RENAL ARTERY ANGIOGRAM       VASCULAR SURGERY          MEDICATIONS:   Current Outpatient Prescriptions:      HYDROcodone-acetaminophen (NORCO) 5-325 MG per tablet, Take 0.5-1 tablets by mouth every 8 hours as needed for pain, Disp: 20 tablet, Rfl: 0     traZODone (DESYREL) 100 MG tablet, TAKE 2 TABLETS BY MOUTH AT BEDTIME, Disp: 180 tablet, Rfl: 0     lidocaine (LIDODERM) 5 % Patch, Apply up to 3 patches to painful area at once for up to 12 h within a 24 h period.  Remove after 12 hours., Disp: 90 patch, Rfl: 3     sertraline (ZOLOFT) 100 MG tablet, TAKE 2 TABLETS (200 MG) BY MOUTH DAILY, Disp: 180 tablet, Rfl: 3     traZODone (DESYREL) 100 MG tablet, TAKE 2 TABLETS BY MOUTH AT BEDTIME, Disp: 180 tablet, Rfl: 0     atorvastatin (LIPITOR) 80 MG tablet, Take 1 tablet (80 mg) by mouth daily Appointment needed for additional refills., Disp: 90 tablet, Rfl: 0     metoprolol (TOPROL-XL) 50 MG 24 hr tablet, Take 1 tablet (50 mg) by mouth daily, Disp: 90 tablet, Rfl: 2     lisinopril (PRINIVIL/ZESTRIL) 10 MG tablet, Take 1 tablet (10 mg) by mouth daily, Disp: 90 tablet, Rfl: 1     cyclobenzaprine (FLEXERIL) 10 MG tablet, Take 1 tablet (10 mg) by mouth 3 times daily, Disp: 270 tablet, Rfl: 1     dutasteride (AVODART) 0.5 MG  "capsule, TAKE 1 CAPSULE (0.5 MG) BY MOUTH DAILY, Disp: 90 capsule, Rfl: 3     pantoprazole (PROTONIX) 40 MG EC tablet, Take 1 tablet (40 mg) by mouth daily, Disp: 90 tablet, Rfl: 3     varenicline (CHANTIX) 1 MG tablet, Take 1 tablet (1 mg) by mouth 2 times daily, Disp: 56 tablet, Rfl: 2     CALCIUM CARBONATE-VIT D-MIN 8078-5410 MG-UNIT PO CHEW, 1 TABLET THREE TIMES DAILY, Disp: , Rfl:      VITAMIN D 1000 UNIT OR CAPS, 1 CAPSULE DAILY, Disp: 0, Rfl: 0     ASCORBIC ACID 1000 MG PO TABS, 1 TABLET DAILY AT DINNER, Disp: 30, Rfl: 0     MULTI-VITAMIN OR TABS, 1 TABLET DAILY , Disp: 0, Rfl: 0     ASPIRIN 81 MG OR TABS, 1 TABLET DAILY, Disp: 100, Rfl: 0     ALLERGIES:    Allergies   Allergen Reactions     No Known Drug Allergies         SOCIAL HISTORY:   Social History     Social History     Marital status: Remarried     Spouse name: Jessie Kaur     Number of children: 4     Years of education: N/A     Occupational History     Not on file.     Social History Main Topics     Smoking status: Current Every Day Smoker     Packs/day: 1.00     Years: 32.00     Types: Cigarettes     Smokeless tobacco: Never Used      Comment:  E-cigg, occasional cigs daily, hasnt smoked for a few weeks     Alcohol use Yes      Comment: occ-hardly ever 2-3 times a year     Drug use: Yes     Special: Marijuana      Comment: MARIJUANA occas     Sexual activity: Yes     Partners: Female     Other Topics Concern     Blood Transfusions No     Parent/Sibling W/ Cabg, Mi Or Angioplasty Before 65f 55m? No     Social History Narrative        FAMILY HISTORY:   Family History   Problem Relation Age of Onset     Alcohol/Drug Mother      Depression Mother      DIABETES Maternal Grandfather      HEART DISEASE Maternal Grandfather      DIABETES Maternal Grandmother      Alcohol/Drug Brother      Alcohol/Drug Sister         EXAM:Vitals: /78  Temp 98.6  F (37  C) (Temporal)  Ht 5' 10\" (1.778 m)  Wt 172 lb (78 kg)  BMI 24.68 kg/m2  BMI= Body mass index is " 24.68 kg/(m^2).    General appearance: Patient is alert and fully cooperative with history & exam.  No sign of distress is noted during the visit.     Psychiatric: Affect is pleasant & appropriate.  Patient appears motivated to improve health.     Respiratory: Breathing is regular & unlabored while sitting.     HEENT: Hearing is intact to spoken word.  Speech is clear.  No gross evidence of visual impairment that would impact ambulation.     Vascular: DP & PT pulses are intact & regular bilaterally.  No significant edema or temperature changes noted however there is mild edema and diminished hair growth distal to mid tibia.     Neurologic: Lower extremity sensation is intact to light touch.  No evidence of weakness or contracture in the lower extremities.  No evidence of neuropathy.    Dermatologic: Slightly diminished texture turgor tone about the integument.  The left hallux nail is poorly attached but no abscess or drainage is noted.  Subtle erythema noted in the nail is slightly dystrophic.  No fluctuance or edema about the hallux.    Musculoskeletal: Patient is ambulatory without assistive device or brace.  Mild contracture of the lesser toes.    Radiographs: Ordered     ASSESSMENT:       ICD-10-CM    1. Ingrowing nail L60.0 XR Foot Left G/E 3 Views   2. Chronic pain of right lower extremity M79.604 HYDROcodone-acetaminophen (NORCO) 5-325 MG per tablet    G89.29         PLAN:  Reviewed patient's chart in [facility].      3/28/2018   Patient has pain out of proportion on the left hallux nail.  I trimmed all portions of the toenail that are not attached and no abscess is noted.  There does not appear to be a subungual hematoma however he may have had one a couple of months ago.  There is poor attachment of the nail as it is slightly dystrophic.  Order radiographs however he did not feel that was necessary.  He did request Vicodin.  I recommended he follow-up with primary care provider regarding any chronic discomfort  he may have an a one-time fill of Vicodin was dispensed.  Follow-up if he would like to move forward with workup including radiographs.    Aroldo Ernst DPM              Again, thank you for allowing me to participate in the care of your patient.        Sincerely,        Aroldo Ernst DPM

## 2018-03-28 NOTE — NURSING NOTE
"Chief Complaint   Patient presents with     Consult     Left great toe pain under toenail, onset Jan 2018; new pt     Hypertension     podiatry quality <150/90       Initial /78  Temp 98.6  F (37  C) (Temporal)  Ht 5' 10\" (1.778 m)  Wt 172 lb (78 kg)  BMI 24.68 kg/m2 Estimated body mass index is 24.68 kg/(m^2) as calculated from the following:    Height as of this encounter: 5' 10\" (1.778 m).    Weight as of this encounter: 172 lb (78 kg).  BP completed using cuff size: regular  Medication Reconciliation: complete    Tonya White CMA, March 28, 2018  "

## 2018-03-28 NOTE — MR AVS SNAPSHOT
"              After Visit Summary   3/28/2018    Jeffy Rogers    MRN: [id number]           Patient Information     Date Of Birth          1950        Visit Information        Provider Department      3/28/2018 11:00 AM Aroldo Ernst DPM [facility] [employer] [facility]        Today's Diagnoses     Ingrowing nail    -  1    Chronic pain of right lower extremity          Care Instructions    Follow-up as needed.          Follow-ups after your visit        Who to contact     If you have questions or need follow up information about today's clinic visit or your schedule please contact [facility] directly at [phone].  Normal or non-critical lab and imaging results will be communicated to you by YieldPlanethart, letter or phone within 4 business days after the clinic has received the results. If you do not hear from us within 7 days, please contact the clinic through Zemantat or phone. If you have a critical or abnormal lab result, we will notify you by phone as soon as possible.  Submit refill requests through PAYMEY or call your pharmacy and they will forward the refill request to us. Please allow 3 business days for your refill to be completed.          Additional Information About Your Visit        MyChart Information     PAYMEY gives you secure access to your electronic health record. If you see a primary care provider, you can also send messages to your care team and make appointments. If you have questions, please call your primary care clinic.  If you do not have a primary care provider, please call [phone] and they will assist you.        Care EveryWhere ID     This is your Care EveryWhere ID. This could be used by other organizations to access your [facility] records  [id number]        Your Vitals Were     Temperature Height BMI (Body Mass Index)             98.6  F (37  C) (Temporal) 5' 10\" (1.778 m) 24.68 kg/m2          Blood Pressure from Last 3 Encounters:   03/28/18 130/78 "   12/27/17 146/76   06/02/17 134/68    Weight from Last 3 Encounters:   03/28/18 172 lb (78 kg)   12/27/17 179 lb 1.6 oz (81.2 kg)   06/02/17 172 lb 6.4 oz (78.2 kg)              We Performed the Following     XR Foot Left G/E 3 Views          Where to get your medicines      Some of these will need a paper prescription and others can be bought over the counter.  Ask your nurse if you have questions.     Bring a paper prescription for each of these medications     HYDROcodone-acetaminophen 5-325 MG per tablet          Primary Care Provider Office Phone # Fax #    Logan Dewey [phone] [address] [place] [address]        Equal Access to Services     CHUCKIE MUELLER : Swati hallo Soemelyali, waaxda luqadaha, qaybta kaalmada adeegyada, jeffrey leal. So [facility] [phone].    ATENCIÓN: Si habla español, tiene a redman disposición servicios gratuitos de asistencia lingüística. [facility] [phone].    We comply with applicable federal civil rights laws and [place] laws. We do not discriminate on the basis of race, color, national origin, age, disability, sex, sexual orientation, or gender identity.            Thank you!     Thank you for choosing [facility]  for your care. Our goal is always to provide you with excellent care. Hearing back from our patients is one way we can continue to improve our services. Please take a few minutes to complete the written survey that you may receive in the mail after your visit with us. Thank you!             Your Updated Medication List - Protect others around you: Learn how to safely use, store and throw away your medicines at www.disposemymeds.org.          This list is accurate as of 3/28/18 11:22 AM.  Always use your most recent med list.                   Brand Name Dispense Instructions for use Diagnosis    ascorbic acid 1000 MG Tabs tablet     30    1 TABLET DAILY AT DINNER        aspirin  81 MG tablet     100    1 TABLET DAILY    Essential hypertension, benign       atorvastatin 80 MG tablet    LIPITOR    90 tablet    Take 1 tablet (80 mg) by mouth daily Appointment needed for additional refills.    Hyperlipidemia LDL goal <100       calcium carbonate-Vit D-Min 1173-7491 MG-UNIT Chew      1 TABLET THREE TIMES DAILY        cyclobenzaprine 10 MG tablet    FLEXERIL    270 tablet    Take 1 tablet (10 mg) by mouth 3 times daily    Chronic right-sided low back pain with right-sided sciatica, Chronic pain of right lower extremity, Nerve pain       dutasteride 0.5 MG capsule    AVODART    90 capsule    TAKE 1 CAPSULE (0.5 MG) BY MOUTH DAILY    BPH with urinary obstruction       HYDROcodone-acetaminophen 5-325 MG per tablet    NORCO    20 tablet    Take 0.5-1 tablets by mouth every 8 hours as needed for pain    Chronic pain of right lower extremity       lidocaine 5 % Patch    LIDODERM    90 patch    Apply up to 3 patches to painful area at once for up to 12 h within a 24 h period.  Remove after 12 hours.    Chronic pain of right lower extremity       lisinopril 10 MG tablet    PRINIVIL/ZESTRIL    90 tablet    Take 1 tablet (10 mg) by mouth daily    Hypertension goal BP (blood pressure) < 140/90       metoprolol succinate 50 MG 24 hr tablet    TOPROL-XL    90 tablet    Take 1 tablet (50 mg) by mouth daily    Hypertension goal BP (blood pressure) < 140/90       Multi-vitamin Tabs tablet   Generic drug:  multivitamin, therapeutic with minerals     0    1 TABLET DAILY        pantoprazole 40 MG EC tablet    PROTONIX    90 tablet    Take 1 tablet (40 mg) by mouth daily    Gastroesophageal reflux disease without esophagitis       sertraline 100 MG tablet    ZOLOFT    180 tablet    TAKE 2 TABLETS (200 MG) BY MOUTH DAILY        * traZODone 100 MG tablet    DESYREL    180 tablet    TAKE 2 TABLETS BY MOUTH AT BEDTIME    Insomnia due to medical condition       * traZODone 100 MG tablet    DESYREL    180 tablet    TAKE 2  TABLETS BY MOUTH AT BEDTIME    Insomnia due to medical condition       varenicline 1 MG tablet    CHANTIX    56 tablet    Take 1 tablet (1 mg) by mouth 2 times daily    Tobacco use disorder       vitamin D 1000 UNITS capsule     0    1 CAPSULE DAILY        * Notice:  This list has 2 medication(s) that are the same as other medications prescribed for you. Read the directions carefully, and ask your doctor or other care provider to review them with you.

## 2018-03-28 NOTE — PROGRESS NOTES
HPI:  Jeffy Rogers is a 67 year old male who is seen in consultation at the request of self.    Pt presents for eval of:   (Onset, Location, L/R, Character, Treatments, Injury if yes)     Onset Jan 2018, pain under Left great toenail. No injury noted.   Constant, stabbing, throbbing, redness, pain 7    Retired and play in a band.    BMI is Normal.    Patient to follow up with Primary Care provider regarding elevated blood pressure.    ROS:  10 point ROS neg other than the symptoms noted above in the HPI.    PAST MEDICAL HISTORY:   Past Medical History:   Diagnosis Date     Acute kidney failure, unspecified 07/04/2006    Secondary to medication effect.     Depressive disorder, not elsewhere classified      Depressive disorder, not elsewhere classified 04/18/11    D/C 04/20/11-[facility]     Essential hypertension, benign 6/28/2006     Hyperlipidaemia      MRSA (methicillin resistant Staphylococcus aureus)      PAD (peripheral artery disease) (H)      Pneumonia, organism unspecified(486) 8/30/10     Right inguinal hernia 3/4/2009     Syncope and collapse 07/04/2006    Syncopal episodes secondary to hypotension secondary to medication effect.     Tobacco use disorder 2/27/2002     Unspecified hypothyroidism 3/10/2003        PAST SURGICAL HISTORY:   Past Surgical History:   Procedure Laterality Date     ABDOMEN SURGERY       ANGIOGRAM       aortic iliac renal bypass  2000     BACK SURGERY       C APPENDECTOMY  1969     C NONSPECIFIC PROCEDURE  08/1999    left common iliac angioplasty and stent placement     C NONSPECIFIC PROCEDURE  11/2000    aortal femoral bypass and repair of renal artery stenosis     C VEIN IN SITU BYPASS GRAFT,FEM-POP  04/07/2003    Right femoral to above knee popliteal area bypass with 8 mm Dacron graft. Aortogram with runoff.     ESOPHAGOSCOPY, GASTROSCOPY, DUODENOSCOPY (EGD), COMBINED  11/23/2010    COMBINED ESOPHAGOSCOPY, GASTROSCOPY, DUODENOSCOPY (EGD), BIOPSY SINGLE OR  MULTIPLE performed by MADDY SANCHEZ at  GI     ESOPHAGOSCOPY, GASTROSCOPY, DUODENOSCOPY (EGD), COMBINED N/A 12/18/2015    Procedure: COMBINED ESOPHAGOSCOPY, GASTROSCOPY, DUODENOSCOPY (EGD), BIOPSY SINGLE OR MULTIPLE;  Surgeon: Benton Gutiérrez MD;  Location:  GI     HC COLONOSCOPY W BIOPSY  03/10/08     HC REMOVE TONSILS/ADENOIDS,<11 Y/O      unsure of age     HC UGI ENDOSCOPY, SIMPLE EXAM  10/13/09     HERNIA REPAIR, INGUINAL RT/LT  03/30/09    Right     RENAL ARTERY ANGIOGRAM       VASCULAR SURGERY          MEDICATIONS:   Current Outpatient Prescriptions:      HYDROcodone-acetaminophen (NORCO) 5-325 MG per tablet, Take 0.5-1 tablets by mouth every 8 hours as needed for pain, Disp: 20 tablet, Rfl: 0     traZODone (DESYREL) 100 MG tablet, TAKE 2 TABLETS BY MOUTH AT BEDTIME, Disp: 180 tablet, Rfl: 0     lidocaine (LIDODERM) 5 % Patch, Apply up to 3 patches to painful area at once for up to 12 h within a 24 h period.  Remove after 12 hours., Disp: 90 patch, Rfl: 3     sertraline (ZOLOFT) 100 MG tablet, TAKE 2 TABLETS (200 MG) BY MOUTH DAILY, Disp: 180 tablet, Rfl: 3     traZODone (DESYREL) 100 MG tablet, TAKE 2 TABLETS BY MOUTH AT BEDTIME, Disp: 180 tablet, Rfl: 0     atorvastatin (LIPITOR) 80 MG tablet, Take 1 tablet (80 mg) by mouth daily Appointment needed for additional refills., Disp: 90 tablet, Rfl: 0     metoprolol (TOPROL-XL) 50 MG 24 hr tablet, Take 1 tablet (50 mg) by mouth daily, Disp: 90 tablet, Rfl: 2     lisinopril (PRINIVIL/ZESTRIL) 10 MG tablet, Take 1 tablet (10 mg) by mouth daily, Disp: 90 tablet, Rfl: 1     cyclobenzaprine (FLEXERIL) 10 MG tablet, Take 1 tablet (10 mg) by mouth 3 times daily, Disp: 270 tablet, Rfl: 1     dutasteride (AVODART) 0.5 MG capsule, TAKE 1 CAPSULE (0.5 MG) BY MOUTH DAILY, Disp: 90 capsule, Rfl: 3     pantoprazole (PROTONIX) 40 MG EC tablet, Take 1 tablet (40 mg) by mouth daily, Disp: 90 tablet, Rfl: 3     varenicline (CHANTIX) 1 MG tablet, Take 1 tablet (1 mg)  "by mouth 2 times daily, Disp: 56 tablet, Rfl: 2     CALCIUM CARBONATE-VIT D-MIN 2773-0426 MG-UNIT PO CHEW, 1 TABLET THREE TIMES DAILY, Disp: , Rfl:      VITAMIN D 1000 UNIT OR CAPS, 1 CAPSULE DAILY, Disp: 0, Rfl: 0     ASCORBIC ACID 1000 MG PO TABS, 1 TABLET DAILY AT DINNER, Disp: 30, Rfl: 0     MULTI-VITAMIN OR TABS, 1 TABLET DAILY , Disp: 0, Rfl: 0     ASPIRIN 81 MG OR TABS, 1 TABLET DAILY, Disp: 100, Rfl: 0     ALLERGIES:    Allergies   Allergen Reactions     No Known Drug Allergies         SOCIAL HISTORY:   Social History     Social History     Marital status: Remarried     Spouse name: Jessie Kaur     Number of children: 4     Years of education: N/A     Occupational History     Not on file.     Social History Main Topics     Smoking status: Current Every Day Smoker     Packs/day: 1.00     Years: 32.00     Types: Cigarettes     Smokeless tobacco: Never Used      Comment:  E-cigg, occasional cigs daily, hasnt smoked for a few weeks     Alcohol use Yes      Comment: occ-hardly ever 2-3 times a year     Drug use: Yes     Special: Marijuana      Comment: MARIJUANA occas     Sexual activity: Yes     Partners: Female     Other Topics Concern     Blood Transfusions No     Parent/Sibling W/ Cabg, Mi Or Angioplasty Before 65f 55m? No     Social History Narrative        FAMILY HISTORY:   Family History   Problem Relation Age of Onset     Alcohol/Drug Mother      Depression Mother      DIABETES Maternal Grandfather      HEART DISEASE Maternal Grandfather      DIABETES Maternal Grandmother      Alcohol/Drug Brother      Alcohol/Drug Sister         EXAM:Vitals: /78  Temp 98.6  F (37  C) (Temporal)  Ht 5' 10\" (1.778 m)  Wt 172 lb (78 kg)  BMI 24.68 kg/m2  BMI= Body mass index is 24.68 kg/(m^2).    General appearance: Patient is alert and fully cooperative with history & exam.  No sign of distress is noted during the visit.     Psychiatric: Affect is pleasant & appropriate.  Patient appears motivated to improve " health.     Respiratory: Breathing is regular & unlabored while sitting.     HEENT: Hearing is intact to spoken word.  Speech is clear.  No gross evidence of visual impairment that would impact ambulation.     Vascular: DP & PT pulses are intact & regular bilaterally.  No significant edema or temperature changes noted however there is mild edema and diminished hair growth distal to mid tibia.     Neurologic: Lower extremity sensation is intact to light touch.  No evidence of weakness or contracture in the lower extremities.  No evidence of neuropathy.    Dermatologic: Slightly diminished texture turgor tone about the integument.  The left hallux nail is poorly attached but no abscess or drainage is noted.  Subtle erythema noted in the nail is slightly dystrophic.  No fluctuance or edema about the hallux.    Musculoskeletal: Patient is ambulatory without assistive device or brace.  Mild contracture of the lesser toes.    Radiographs: Ordered     ASSESSMENT:       ICD-10-CM    1. Ingrowing nail L60.0 XR Foot Left G/E 3 Views   2. Chronic pain of right lower extremity M79.604 HYDROcodone-acetaminophen (NORCO) 5-325 MG per tablet    G89.29         PLAN:  Reviewed patient's chart in [facility].      3/28/2018   Patient has pain out of proportion on the left hallux nail.  I trimmed all portions of the toenail that are not attached and no abscess is noted.  There does not appear to be a subungual hematoma however he may have had one a couple of months ago.  There is poor attachment of the nail as it is slightly dystrophic.  Order radiographs however he did not feel that was necessary.  He did request Vicodin.  I recommended he follow-up with primary care provider regarding any chronic discomfort he may have an a one-time fill of Vicodin was dispensed.  Follow-up if he would like to move forward with workup including radiographs.    Aroldo Ernst DPM

## 2018-04-02 DIAGNOSIS — E78.5 HYPERLIPIDEMIA LDL GOAL <100: ICD-10-CM

## 2018-04-04 RX ORDER — ATORVASTATIN CALCIUM 80 MG/1
80 TABLET, FILM COATED ORAL DAILY
Qty: 90 TABLET | Refills: 0 | Status: SHIPPED | OUTPATIENT
Start: 2018-04-04 | End: 2018-06-30

## 2018-04-04 NOTE — TELEPHONE ENCOUNTER
atorvastatin (LIPITOR) 80 MG tablet  Prescription approved per [facility] Refill Protocol.    Due for OV around 06/2018. Please assist with scheduling.    Paradise Mendoza RN, BSN

## 2018-05-22 DIAGNOSIS — G47.01 INSOMNIA DUE TO MEDICAL CONDITION: ICD-10-CM

## 2018-05-23 RX ORDER — TRAZODONE HYDROCHLORIDE 100 MG/1
TABLET ORAL
Qty: 60 TABLET | Refills: 0 | Status: SHIPPED | OUTPATIENT
Start: 2018-05-23 | End: 2018-06-19

## 2018-05-23 NOTE — TELEPHONE ENCOUNTER
Spoke to patient and relayed message below. Patient expressed understanding and scheduled appt for DJ's first available: 6/27

## 2018-05-23 NOTE — TELEPHONE ENCOUNTER
"Requested Prescriptions   Pending Prescriptions Disp Refills     traZODone (DESYREL) 100 MG tablet [Pharmacy Med Name: TRAZODONE 100 MG TABLET] 180 tablet 0     Sig: TAKE 2 TABLETS BY MOUTH AT BEDTIME    Serotonin Modulators Passed    5/22/2018  1:19 PM       Passed - Recent (12 mo) or future (30 days) visit within the authorizing provider's specialty    Patient had office visit in the last 12 months or has a visit in the next 30 days with authorizing provider or within the authorizing provider's specialty.  See \"Patient Info\" tab in inbasket, or \"Choose Columns\" in Meds & Orders section of the refill encounter.           Passed - Patient is age 18 or older        traZODone (DESYREL) 100 MG tablet  Medication is being filled for 1 time refill only due to:  Patient needs to be seen because due for 6 month follow up 06/2018.   Please assist with scheduling.    Paradise Mendoza RN, BSN       "

## 2018-06-19 DIAGNOSIS — G47.01 INSOMNIA DUE TO MEDICAL CONDITION: ICD-10-CM

## 2018-06-20 RX ORDER — TRAZODONE HYDROCHLORIDE 100 MG/1
TABLET ORAL
Qty: 60 TABLET | Refills: 0 | Status: SHIPPED | OUTPATIENT
Start: 2018-06-20 | End: 2018-06-21

## 2018-06-20 NOTE — TELEPHONE ENCOUNTER
Trazodone    Next 5 appointments (look out 90 days)     Jun 27, 2018  2:15 PM CDT   Office Visit with Benton Browne MD   [facility]    [address] [address] [phone]                Prescription approved per [facility] Refill Protocol.    Ivania Frances RN, BSN

## 2018-06-20 NOTE — PROGRESS NOTES
SUBJECTIVE:   Jeffy Rogers is a 67 year old male who presents to clinic today for the following health issues:    The 10-year ASCVD risk score ([place] KASSIE Jr, et al., 2013) is: 24.9%    Values used to calculate the score:      Age: 67 years      Sex: Male      Is Non- : No      Diabetic: No      Tobacco smoker: Yes      Systolic Blood Pressure: 154 mmHg      Is BP treated: Yes      HDL Cholesterol: 39 mg/dL      Total Cholesterol: 132 mg/dL  Patient is eligible for use of low-dose aspirin for primary prevention of heart attack and stroke.  Provider has discussed aspirin with patient and our decision was:     Prescribe:  Daily low-dose aspirin recommended for primary prevention, patient agrees with plan.    Follow up sleep problem-trazodone  Having a bunch of panic attacks-personal issues going on.  His daughter has accused him of some things.  He's going through a divorce.  Lots of  fees.  He's still taking sertraline and trazodone.      Lower back pains and into his right leg as usual he says.    Injection into the lateral femoral cutaneous nerve worked for about 3 weeks and now pain is back.  This did help for a while.  Previous trials of gabapentin and nortriptyline were ineffective.      MAPS also wanted him to change to tizanidine from Flexeril, but he doesn't recall anything about that.      He stopped using marijuana about 4 months ago.  He was court-ordered to do so.  He's still smoking tobacco.  Would be interested in taking Chantix further for this.  Has had vivid dreams.      Discuss PSA testing-prostate testing - just done in December.  Was having trouble urinating while standing.  Does have some nocturia,  Does have difficulty starting to urinate.  Denies dribbling or incomplete voiding.      Still taking his BP medications.  States his pressure is high with anxiety.  Has been on Xanax in the past.      PHQ-9 SCORE 6/2/2017 12/27/2017 6/27/2018   Total Score - - -   Total  Score MyChart - 3 (Minimal depression) 13 (Moderate depression)   Total Score 5 3 13     DEBRA-7 SCORE 6/2/2017 12/27/2017 6/27/2018   Total Score - - -   Total Score - 0 (minimal anxiety) 13 (moderate anxiety)   Total Score 2 0 13         HPI    Problem list and histories reviewed & adjusted, as indicated.  Additional history: as documented      Current Outpatient Prescriptions   Medication Sig Dispense Refill     ASCORBIC ACID 1000 MG PO TABS 1 TABLET DAILY AT DINNER 30 0     ASPIRIN 81 MG OR TABS 1 TABLET DAILY 100 0     atorvastatin (LIPITOR) 80 MG tablet Take 1 tablet (80 mg) by mouth daily 90 tablet 0     CALCIUM CARBONATE-VIT D-MIN 5522-4843 MG-UNIT PO CHEW 1 TABLET THREE TIMES DAILY       cyclobenzaprine (FLEXERIL) 10 MG tablet Take 1 tablet (10 mg) by mouth 3 times daily 270 tablet 1     DULoxetine (CYMBALTA) 60 MG EC capsule Take 1 capsule (60 mg) by mouth daily 30 capsule 2     dutasteride (AVODART) 0.5 MG capsule TAKE 1 CAPSULE (0.5 MG) BY MOUTH DAILY 90 capsule 3     hydrOXYzine (ATARAX) 25 MG tablet Take 1-2 tablets (25-50 mg) by mouth every 6 hours as needed for anxiety 60 tablet 1     lidocaine (LIDODERM) 5 % Patch Apply up to 3 patches to painful area at once for up to 12 h within a 24 h period.  Remove after 12 hours. 90 patch 3     lisinopril (PRINIVIL/ZESTRIL) 10 MG tablet Take 1 tablet (10 mg) by mouth daily 90 tablet 1     metoprolol (TOPROL-XL) 50 MG 24 hr tablet Take 1 tablet (50 mg) by mouth daily 90 tablet 2     MULTI-VITAMIN OR TABS 1 TABLET DAILY  0 0     pantoprazole (PROTONIX) 40 MG EC tablet Take 1 tablet (40 mg) by mouth daily 90 tablet 3     sertraline (ZOLOFT) 100 MG tablet TAKE 2 TABLETS (200 MG) BY MOUTH DAILY 180 tablet 3     traZODone (DESYREL) 100 MG tablet TAKE 2 TABLETS BY MOUTH AT BEDTIME *DUE FOR 6 MONTH FOLLOW UP* 180 tablet 0     varenicline (CHANTIX STARTING MONTH DORIS) 0.5 MG X 11 & 1 MG X 42 tablet Take 0.5 mg tab daily for 3 days, then 0.5 mg tab twice daily for 4 days,  then 1 mg twice daily. 53 tablet 0     varenicline (CHANTIX) 1 MG tablet Take 1 tablet (1 mg) by mouth 2 times daily 56 tablet 2     varenicline (CHANTIX) 1 MG tablet Take 1 tablet (1 mg) by mouth 2 times daily 56 tablet 2     VITAMIN D 1000 UNIT OR CAPS 1 CAPSULE DAILY 0 0     Recent Labs   Lab Test  06/27/18   1525  12/28/17   1018  06/02/17   1615  12/05/16   1601   11/20/15   1045   04/21/11   1241  12/08/10   1202   LDL   --   75   --   76   --   51   < >  107  63   HDL   --   39*   --   42   --   40   < >  49  44   TRIG   --   90   --   147   --   111   < >  134  96   ALT   --    --   20   --    --    --    --   16  12   CR  1.61*   --   1.17  1.09   < >  0.96   < >   --    --    GFRESTIMATED  43*   --   62  68   < >  79   < >   --    --    GFRESTBLACK  52*   --   76  82   < >  >90   GFR Calc     < >   --    --    POTASSIUM  3.8   --   5.1  4.1   < >  4.4   < >   --    --    TSH  0.62   --   0.51  0.08*   --   0.50   < >   --    --     < > = values in this interval not displayed.      BP Readings from Last 3 Encounters:   06/27/18 154/78   03/28/18 130/78   12/27/17 146/76    Wt Readings from Last 3 Encounters:   06/27/18 177 lb 3.2 oz (80.4 kg)   03/28/18 172 lb (78 kg)   12/27/17 179 lb 1.6 oz (81.2 kg)                    ROS:  Constitutional, HEENT, cardiovascular, pulmonary, gi and gu systems are negative, except as otherwise noted.    OBJECTIVE:     /78 (BP Location: Left arm, Patient Position: Chair, Cuff Size: Adult Regular)  Pulse 76  Temp 99.5  F (37.5  C) (Temporal)  Wt 177 lb 3.2 oz (80.4 kg)  BMI 25.43 kg/m2  Body mass index is 25.43 kg/(m^2).  GENERAL: healthy, alert and no distress  NECK: no adenopathy, no asymmetry, masses, or scars and thyroid normal to palpation  RESP: lungs clear to auscultation - no rales, rhonchi or wheezes  CV: regular rate and rhythm, normal S1 S2, no S3 or S4, no murmur, click or rub, no peripheral edema and peripheral pulses strong  ABDOMEN:  soft, nontender, no hepatosplenomegaly, no masses and bowel sounds normal  MS: low back pain on right side and into right lateral leg.    NEURO: numbness in lateral right leg.    Diagnostic Test Results:  Results for orders placed or performed in visit on 06/27/18   Basic metabolic panel   Result Value Ref Range    Sodium 143 133 - 144 mmol/L    Potassium 3.8 3.4 - 5.3 mmol/L    Chloride 107 94 - 109 mmol/L    Carbon Dioxide 25 20 - 32 mmol/L    Anion Gap 11 3 - 14 mmol/L    Glucose 102 (H) 70 - 99 mg/dL    Urea Nitrogen 16 7 - 30 mg/dL    Creatinine 1.61 (H) 0.66 - 1.25 mg/dL    GFR Estimate 43 (L) >60 mL/min/1.7m2    GFR Estimate If Black 52 (L) >60 mL/min/1.7m2    Calcium 8.3 (L) 8.5 - 10.1 mg/dL   Hemoglobin   Result Value Ref Range    Hemoglobin 13.2 (L) 13.3 - 17.7 g/dL   Drug Abuse Screen Panel 13, Urine (Pain Care Package)   Result Value Ref Range    Cannabinoids (60-sel-2-carboxy-9-THC) Not Detected NDET^Not Detected ng/mL    Phencyclidine (Phencyclidine) Not Detected NDET^Not Detected ng/mL    Cocaine (Benzoylecgonine) Not Detected NDET^Not Detected ng/mL    Methamphetamine (d-Methamphetamine) Not Detected NDET^Not Detected ng/mL    Opiates (Morphine) Not Detected NDET^Not Detected ng/mL    Amphetamine (d-Amphetamine) Not Detected NDET^Not Detected ng/mL    Benzodiazepines (Nordiazepam) Not Detected NDET^Not Detected ng/mL    Tricyclic Antidepressants (Desipramine) Not Detected NDET^Not Detected ng/mL    Methadone (Methadone) Not Detected NDET^Not Detected ng/mL    Barbiturates (Butalbital) Not Detected NDET^Not Detected ng/mL    Oxycodone (Oxycodone) Not Detected NDET^Not Detected ng/mL    Propoxyphene (Norpropoxyphene) Not Detected NDET^Not Detected ng/mL    Buprenorphine (Buprenorphine) Not Detected NDET^Not Detected ng/mL   TSH with free T4 reflex   Result Value Ref Range    TSH 0.62 0.40 - 4.00 mU/L       ASSESSMENT/PLAN:     Tobacco Cessation:   reports that he has been smoking Cigarettes.  He has a  32.00 pack-year smoking history. He has never used smokeless tobacco.  Tobacco Cessation Action Plan: Information offered: Patient not interested at this time        ICD-10-CM    1. Meralgia paresthetica of right side G57.11 Basic metabolic panel     DULoxetine (CYMBALTA) 60 MG EC capsule     MR Lumbar Spine w/o Contrast   2. Chronic pain of right lower extremity M79.604 cyclobenzaprine (FLEXERIL) 10 MG tablet    G89.29 MR Lumbar Spine w/o Contrast   3. Chronic right-sided low back pain with right-sided sciatica M54.41 cyclobenzaprine (FLEXERIL) 10 MG tablet    G89.29 MR Lumbar Spine w/o Contrast   4. Major depression in complete remission (H) F32.5    5. Panic attack F41.0 DULoxetine (CYMBALTA) 60 MG EC capsule     hydrOXYzine (ATARAX) 25 MG tablet     TSH with free T4 reflex   6. Tobacco use disorder F17.200 TOBACCO CESSATION - FOR HEALTH MAINTENANCE     Hemoglobin     varenicline (CHANTIX) 1 MG tablet     varenicline (CHANTIX) 1 MG tablet     varenicline (CHANTIX STARTING MONTH PAK) 0.5 MG X 11 & 1 MG X 42 tablet   7. Nerve pain M79.2 cyclobenzaprine (FLEXERIL) 10 MG tablet     MR Lumbar Spine w/o Contrast   8. Marijuana abuse F12.10 Drug Abuse Screen Panel 13, Urine (Pain Care Package)   9. Hyperlipidemia LDL goal <100 E78.5    10. Hypertension goal BP (blood pressure) < 140/90 I10 TSH with free T4 reflex     1, 2, 3, 7.  Most of his symptoms are still consistent with meralgia paresthetica.  Patient is concerned that there may be a problem in his back, but I am less concerned.  Still, it is reasonable to rule this out so will obtain an MRI of his lumbar spine.  His records from [facility] have not been received, so we will try to obtain these.  He states he had a great response to the nerve block of the lateral femoral cutaneous nerve, so I wonder if radiofrequency ablation might be an option for him.  We will also try the addition of duloxetine, as this may help with both his pain and his mood.  4, 5.  Patient was  interested in a benzodiazepine, but I am hesitant given his past history of multiple controlled substances and illegal substances.  Will do trial of Cymbalta for improvement of his mood and hydroxyzine as needed for panic attacks.  Will check TSH to rule out this underlying hyperthyroidism.  6.  Patient expressed some interest in being willing to try Chantix for smoking cessation again.  Prescription was given to him.  8.  Patient states he is no longer using marijuana, as he was court ordered to quit.  Will check drug screen as this was previously positive for marijuana.  I would not consider prescribing him a controlled substance unless this is clean.  9.  Currently controlled.  Continue current regimen.  10.  Slightly elevated today.  Encouraged lifestyle changes and will recheck in a few weeks.  We will also check TSH and consider other labs.  If not improving, would need to change his medication regimen to try to get this under better control.    Portions of this note were completed using Dragon dictation software.  Although reviewed, there may be typographical and other inadvertent errors that remain.             Patient Instructions   Thank you for visiting [facility]    Let's get your records from [facility].  Can look into another injection.  Consider signing up for Medicare.    Start Duloxetine to help with your panic and pain.   Can use hydroxyzine when having a panic attack.  This will make you sleepy.    Get your MRI set up for your back.    We'll let you know your lab results as soon as we can.     Please Follow Up when indicated on the section below this one on your After-Visit Summary.      If you had imaging scheduled please refer to your radiology prep sheet.    Appointment    Date_______________     Time_____________    Day:   M TU W TH F    With____________________________    Location_________________________    If you need medication refills, please contact your pharmacy 3 days  before your prescriptions runs out. If you are out of refills, your pharmacy will contact contact the clinic.    Contact us or return if questions or concerns.     -Your Care Team:  MD Marlys Jeffrey PA-C Joel De Haan, PA-C Elizabeth McLean, APRN CNP    General information about your clinic      Clinic hours:     Lab hours:  Phone [phone]  Monday 7:30 am-7 pm    Monday 8:30 am-6:30 pm  Tuesday-Friday 7:30 am-5 pm   Tuesday-Friday 8:30 am-4:30 pm    Pharmacy hours:  Phone [phone]  Monday 8:30 am-7pm  Tuesday-Friday 8:30am-6 pm                                       Mychart assistance [phone]        We would like to hear from you, how was your visit today?    Karla Dobbs  Patient Information Supervisor   Patient Care Supervisor  [facility], and [facility]  [phone]         Benton Browne MD, MD  [facility]

## 2018-06-21 ENCOUNTER — TELEPHONE (OUTPATIENT)
Dept: FAMILY MEDICINE | Facility: OTHER | Age: 68
End: 2018-06-21

## 2018-06-21 ENCOUNTER — NURSE TRIAGE (OUTPATIENT)
Dept: NURSING | Facility: CLINIC | Age: 68
End: 2018-06-21

## 2018-06-21 DIAGNOSIS — G47.01 INSOMNIA DUE TO MEDICAL CONDITION: ICD-10-CM

## 2018-06-21 RX ORDER — TRAZODONE HYDROCHLORIDE 100 MG/1
TABLET ORAL
Qty: 180 TABLET | Refills: 0 | Status: SHIPPED | OUTPATIENT
Start: 2018-06-21 | End: 2018-08-09

## 2018-06-21 NOTE — TELEPHONE ENCOUNTER
Jeffy is out of Trazodone now and his next appointment is June 27th.  [facility] called Jeffy and states it is ready to fill and refill was only for one month and Jeffy's insurance requires a 3 month refill.  Jeffy is requesting a refill for 3 months.  Please phone Jeffy at [phone] if there are any questions.

## 2018-06-21 NOTE — TELEPHONE ENCOUNTER
Clinic Action Needed:  Yes, refill  [facility]NA Triage Call  Presenting Problem:    Jeffy is out of Trazodone now and his next appointment is June 27th.  [facility] called Jeffy and states it is ready to fill and refill was only for one month and Jeffy's insurance requires a 3 month refill.  Jeffy is requesting a refill for 3 months.  Please phone Jeffy at [phone] if there are any questions.      Routed to:  RN Pool  Please be sure to close this encounter once this patient's issue/question has been addressed.    Tavia Gonzales RN/[facility]

## 2018-06-21 NOTE — TELEPHONE ENCOUNTER
Due to INS needing 90 will refill at 90 one time.  Will need OV for further refills.  Next 5 appointments (look out 90 days)     Jun 27, 2018  2:15 PM CDT   Office Visit with Benton Browne MD   [facility]    [id number] [id number] [phone]                    Eduardo Tyler RN, BSN

## 2018-06-27 ENCOUNTER — OFFICE VISIT (OUTPATIENT)
Dept: FAMILY MEDICINE | Facility: OTHER | Age: 68
End: 2018-06-27
Payer: COMMERCIAL

## 2018-06-27 VITALS
DIASTOLIC BLOOD PRESSURE: 78 MMHG | WEIGHT: 177.2 LBS | HEART RATE: 76 BPM | SYSTOLIC BLOOD PRESSURE: 154 MMHG | BODY MASS INDEX: 25.43 KG/M2 | TEMPERATURE: 99.5 F

## 2018-06-27 DIAGNOSIS — M54.41 CHRONIC RIGHT-SIDED LOW BACK PAIN WITH RIGHT-SIDED SCIATICA: ICD-10-CM

## 2018-06-27 DIAGNOSIS — F12.10 MARIJUANA ABUSE: ICD-10-CM

## 2018-06-27 DIAGNOSIS — G89.29 CHRONIC RIGHT-SIDED LOW BACK PAIN WITH RIGHT-SIDED SCIATICA: ICD-10-CM

## 2018-06-27 DIAGNOSIS — F41.0 PANIC ATTACK: ICD-10-CM

## 2018-06-27 DIAGNOSIS — F17.200 TOBACCO USE DISORDER: ICD-10-CM

## 2018-06-27 DIAGNOSIS — G57.11 MERALGIA PARESTHETICA OF RIGHT SIDE: Primary | ICD-10-CM

## 2018-06-27 DIAGNOSIS — M79.2 NERVE PAIN: ICD-10-CM

## 2018-06-27 DIAGNOSIS — G89.29 CHRONIC PAIN OF RIGHT LOWER EXTREMITY: ICD-10-CM

## 2018-06-27 DIAGNOSIS — I10 HYPERTENSION GOAL BP (BLOOD PRESSURE) < 140/90: ICD-10-CM

## 2018-06-27 DIAGNOSIS — M79.604 CHRONIC PAIN OF RIGHT LOWER EXTREMITY: ICD-10-CM

## 2018-06-27 DIAGNOSIS — E78.5 HYPERLIPIDEMIA LDL GOAL <100: ICD-10-CM

## 2018-06-27 DIAGNOSIS — F32.5 MAJOR DEPRESSION IN COMPLETE REMISSION (H): ICD-10-CM

## 2018-06-27 LAB
AMPHETAMINES UR QL: NOT DETECTED NG/ML
ANION GAP SERPL CALCULATED.3IONS-SCNC: 11 MMOL/L (ref 3–14)
BARBITURATES UR QL SCN: NOT DETECTED NG/ML
BENZODIAZ UR QL SCN: NOT DETECTED NG/ML
BUN SERPL-MCNC: 16 MG/DL (ref 7–30)
BUPRENORPHINE UR QL: NOT DETECTED NG/ML
CALCIUM SERPL-MCNC: 8.3 MG/DL (ref 8.5–10.1)
CANNABINOIDS UR QL: NOT DETECTED NG/ML
CHLORIDE SERPL-SCNC: 107 MMOL/L (ref 94–109)
CO2 SERPL-SCNC: 25 MMOL/L (ref 20–32)
COCAINE UR QL SCN: NOT DETECTED NG/ML
CREAT SERPL-MCNC: 1.61 MG/DL (ref 0.66–1.25)
D-METHAMPHET UR QL: NOT DETECTED NG/ML
GFR SERPL CREATININE-BSD FRML MDRD: 43 ML/MIN/1.7M2
GLUCOSE SERPL-MCNC: 102 MG/DL (ref 70–99)
HGB BLD-MCNC: 13.2 G/DL (ref 13.3–17.7)
METHADONE UR QL SCN: NOT DETECTED NG/ML
OPIATES UR QL SCN: NOT DETECTED NG/ML
OXYCODONE UR QL SCN: NOT DETECTED NG/ML
PCP UR QL SCN: NOT DETECTED NG/ML
POTASSIUM SERPL-SCNC: 3.8 MMOL/L (ref 3.4–5.3)
PROPOXYPH UR QL: NOT DETECTED NG/ML
SODIUM SERPL-SCNC: 143 MMOL/L (ref 133–144)
TRICYCLICS UR QL SCN: NOT DETECTED NG/ML
TSH SERPL DL<=0.005 MIU/L-ACNC: 0.62 MU/L (ref 0.4–4)

## 2018-06-27 PROCEDURE — 84443 ASSAY THYROID STIM HORMONE: CPT | Performed by: FAMILY MEDICINE

## 2018-06-27 PROCEDURE — 99214 OFFICE O/P EST MOD 30 MIN: CPT | Performed by: FAMILY MEDICINE

## 2018-06-27 PROCEDURE — 36415 COLL VENOUS BLD VENIPUNCTURE: CPT | Performed by: FAMILY MEDICINE

## 2018-06-27 PROCEDURE — 80048 BASIC METABOLIC PNL TOTAL CA: CPT | Performed by: FAMILY MEDICINE

## 2018-06-27 PROCEDURE — 80306 DRUG TEST PRSMV INSTRMNT: CPT | Performed by: FAMILY MEDICINE

## 2018-06-27 PROCEDURE — 85018 HEMOGLOBIN: CPT | Performed by: FAMILY MEDICINE

## 2018-06-27 RX ORDER — HYDROXYZINE HYDROCHLORIDE 25 MG/1
25-50 TABLET, FILM COATED ORAL EVERY 6 HOURS PRN
Qty: 60 TABLET | Refills: 1 | Status: SHIPPED | OUTPATIENT
Start: 2018-06-27 | End: 2018-09-14

## 2018-06-27 RX ORDER — VARENICLINE TARTRATE 1 MG/1
1 TABLET, FILM COATED ORAL 2 TIMES DAILY
Qty: 56 TABLET | Refills: 2 | Status: SHIPPED | OUTPATIENT
Start: 2018-06-27 | End: 2018-09-14

## 2018-06-27 RX ORDER — DULOXETIN HYDROCHLORIDE 60 MG/1
60 CAPSULE, DELAYED RELEASE ORAL DAILY
Qty: 30 CAPSULE | Refills: 2 | Status: SHIPPED | OUTPATIENT
Start: 2018-06-27 | End: 2018-08-27

## 2018-06-27 RX ORDER — CYCLOBENZAPRINE HCL 10 MG
10 TABLET ORAL 3 TIMES DAILY
Qty: 270 TABLET | Refills: 1 | Status: SHIPPED | OUTPATIENT
Start: 2018-06-27 | End: 2018-08-09

## 2018-06-27 ASSESSMENT — PAIN SCALES - GENERAL: PAINLEVEL: EXTREME PAIN (8)

## 2018-06-27 ASSESSMENT — ANXIETY QUESTIONNAIRES
GAD7 TOTAL SCORE: 13
7. FEELING AFRAID AS IF SOMETHING AWFUL MIGHT HAPPEN: MORE THAN HALF THE DAYS
GAD7 TOTAL SCORE: 13
7. FEELING AFRAID AS IF SOMETHING AWFUL MIGHT HAPPEN: MORE THAN HALF THE DAYS
1. FEELING NERVOUS, ANXIOUS, OR ON EDGE: NEARLY EVERY DAY
6. BECOMING EASILY ANNOYED OR IRRITABLE: SEVERAL DAYS
2. NOT BEING ABLE TO STOP OR CONTROL WORRYING: NEARLY EVERY DAY
4. TROUBLE RELAXING: MORE THAN HALF THE DAYS
5. BEING SO RESTLESS THAT IT IS HARD TO SIT STILL: SEVERAL DAYS
GAD7 TOTAL SCORE: 13
3. WORRYING TOO MUCH ABOUT DIFFERENT THINGS: SEVERAL DAYS

## 2018-06-27 ASSESSMENT — PATIENT HEALTH QUESTIONNAIRE - PHQ9
SUM OF ALL RESPONSES TO PHQ QUESTIONS 1-9: 13
SUM OF ALL RESPONSES TO PHQ QUESTIONS 1-9: 13
10. IF YOU CHECKED OFF ANY PROBLEMS, HOW DIFFICULT HAVE THESE PROBLEMS MADE IT FOR YOU TO DO YOUR WORK, TAKE CARE OF THINGS AT HOME, OR GET ALONG WITH OTHER PEOPLE: SOMEWHAT DIFFICULT

## 2018-06-27 NOTE — PATIENT INSTRUCTIONS
Thank you for visiting [facility]    Let's get your records from [facility].  Can look into another injection.  Consider signing up for Medicare.    Start Duloxetine to help with your panic and pain.   Can use hydroxyzine when having a panic attack.  This will make you sleepy.    Get your MRI set up for your back.    We'll let you know your lab results as soon as we can.     Please Follow Up when indicated on the section below this one on your After-Visit Summary.      If you had imaging scheduled please refer to your radiology prep sheet.    Appointment    Date_______________     Time_____________    Day:   M TU W TH F    With____________________________    Location_________________________    If you need medication refills, please contact your pharmacy 3 days before your prescriptions runs out. If you are out of refills, your pharmacy will contact contact the clinic.    Contact us or return if questions or concerns.     -Your Care Team:  MD Marlys Jeffrey PA-C Joel De Haan, PA-C Elizabeth McLean, APRN CNP    General information about your clinic      Clinic hours:     Lab hours:  Phone [phone]  Monday 7:30 am-7 pm    Monday 8:30 am-6:30 pm  Tuesday-Friday 7:30 am-5 pm   Tuesday-Friday 8:30 am-4:30 pm    Pharmacy hours:  Phone [phone]  Monday 8:30 am-7pm  Tuesday-Friday 8:30am-6 pm                                       Mychart assistance [phone]        We would like to hear from you, how was your visit today?    Karla Dobbs  Patient Information Supervisor   Patient Care Supervisor  [facility], and [facility], and [facility]  [phone]

## 2018-06-27 NOTE — MR AVS SNAPSHOT
After Visit Summary   6/27/2018    Jeffy Rogers    MRN: [id number]           Patient Information     Date Of Birth          1950        Visit Information        Provider Department      6/27/2018 2:15 PM Benton Browne MD [facility]        Today's Diagnoses     Meralgia paresthetica of right side    -  1    Chronic pain of right lower extremity        Chronic right-sided low back pain with right-sided sciatica        Major depression in complete remission (H)        Panic attack        Tobacco use disorder        Nerve pain        Marijuana abuse        BPH with urinary obstruction        Hyperlipidemia LDL goal <100        Hypertension goal BP (blood pressure) < 140/90          Care Instructions    Thank you for visiting [facility]    Let's get your records from [employer].  Can look into another injection.  Consider signing up for Medicare.    Start Duloxetine to help with your panic and pain.   Can use hydroxyzine when having a panic attack.  This will make you sleepy.    Get your MRI set up for your back.    We'll let you know your lab results as soon as we can.     Please Follow Up when indicated on the section below this one on your After-Visit Summary.      If you had imaging scheduled please refer to your radiology prep sheet.    Appointment    Date_______________     Time_____________    Day:   M TU W TH F    With____________________________    Location_________________________    If you need medication refills, please contact your pharmacy 3 days before your prescriptions runs out. If you are out of refills, your pharmacy will contact contact the clinic.    Contact us or return if questions or concerns.     -Your Care Team:  MD Marlys Jeffrey PA-C Joel De Haan, PA-C Elizabeth McLean, YUE CALVIN    General information about your clinic      Clinic hours:     Lab hours:  Phone [phone]  Monday 7:30 am-7 pm    Monday 8:30  am-6:30 pm  Tuesday-Friday 7:30 am-5 pm   Tuesday-Friday 8:30 am-4:30 pm    Pharmacy hours:  Phone [phone]  Monday 8:30 am-7pm  Tuesday-Friday 8:30am-6 pm                                       Fernando whaley [phone]        We would like to hear from you, how was your visit today?    Karla Dobbs  Patient Information Supervisor   Patient Care Supervisor  [facility] [place], and [facility]  [phone]             Follow-ups after your visit        Follow-up notes from your care team     Return in about 4 weeks (around 7/25/2018) for Recheck.      Your next 10 appointments already scheduled     Jun 28, 2018 12:30 PM CDT   MR LUMBAR SPINE W/O CONTRAST with PHMR1   [facility] ([facility])    [address] [place] [id number] [phone]           Take your medicines as usual, unless your doctor tells you not to. Bring a list of your current medicines to your exam (including vitamins, minerals and over-the-counter drugs). Also bring the results of similar scans you may have had.  Please remove any body piercings and hair extensions before you arrive.  Follow your doctor s orders. If you do not, we may have to postpone your exam.  You may or may not receive IV contrast for this exam pending the discretion of the Radiologist.  You do not need to do anything special to prepare.  The MRI machine uses a strong magnet. Please wear clothes without metal (snaps, zippers). A sweatsuit works well, or we may give you a hospital gown.   **IMPORTANT** THE INSTRUCTIONS BELOW ARE ONLY FOR THOSE PATIENTS WHO HAVE BEEN PRESCRIBED SEDATION OR GENERAL ANESTHESIA DURING THEIR MRI PROCEDURE:  IF YOUR DOCTOR PRESCRIBED ORAL SEDATION (take medicine to help you relax during your exam):   You must get the medicine from your doctor (oral medication) before you arrive. Bring the medicine to the exam.  Do not take it at home. You ll be told when to take it upon arriving for your exam.   Arrive one hour early. Bring someone who can take you home after the test. Your medicine will make you sleepy. After the exam, you may not drive, take a bus or take a taxi by yourself.  IF YOUR DOCTOR PRESCRIBED IV SEDATION:   Arrive one hour early. Bring someone who can take you home after the test. Your medicine will make you sleepy. After the exam, you may not drive, take a bus or take a taxi by yourself.   No eating 6 hours before your exam. You may have clear liquids up until 4 hours before your exam. (Clear liquids include water, clear tea, black coffee and fruit juice without pulp.)  IF YOUR DOCTOR PRESCRIBED ANESTHESIA (be asleep for your exam):   Arrive 1 1/2 hours early. Bring someone who can take you home after the test. You may not drive, take a bus or take a taxi by yourself.   No eating 8 hours before your exam. You may have clear liquids up until 4 hours before your exam. (Clear liquids include water, clear tea, black coffee and fruit juice without pulp.)   You will spend four to five hours in the recovery room.  Please call the Imaging Department at your exam site with any questions.              Future tests that were ordered for you today     Open Future Orders        Priority Expected Expires Ordered    MR Lumbar Spine w/o Contrast Routine  6/27/2019 6/27/2018            Who to contact     If you have questions or need follow up information about today's clinic visit or your schedule please contact [facility] directly at [phone].  Normal or non-critical lab and imaging results will be communicated to you by MyChart, letter or phone within 4 business days after the clinic has received the results. If you do not hear from us within 7 days, please contact the clinic through MyChart or phone. If you have a critical or abnormal lab result, we will notify you by phone as soon as possible.  Submit  refill requests through [email or web address] or call your pharmacy and they will forward the refill request to us. Please allow 3 business days for your refill to be completed.          Additional Information About Your Visit        PrismTechharListen Edition Information     [email or web address] gives you secure access to your electronic health record. If you see a primary care provider, you can also send messages to your care team and make appointments. If you have questions, please call your primary care clinic.  If you do not have a primary care provider, please call [phone] and they will assist you.        Care EveryWhere ID     This is your Care EveryWhere ID. This could be used by other organizations to access your [facility] medical records  [id number]        Your Vitals Were     Pulse Temperature BMI (Body Mass Index)             76 99.5  F (37.5  C) (Temporal) 25.43 kg/m2          Blood Pressure from Last 3 Encounters:   06/27/18 154/78   03/28/18 130/78   12/27/17 146/76    Weight from Last 3 Encounters:   06/27/18 177 lb 3.2 oz (80.4 kg)   03/28/18 172 lb (78 kg)   12/27/17 179 lb 1.6 oz (81.2 kg)              We Performed the Following     Basic metabolic panel     Drug Abuse Screen Panel 13, Urine (Pain Care Package)     Hemoglobin     TOBACCO CESSATION - FOR HEALTH MAINTENANCE     TSH with free T4 reflex          Today's Medication Changes          These changes are accurate as of 6/27/18  3:18 PM.  If you have any questions, ask your nurse or doctor.               Start taking these medicines.        Dose/Directions    DULoxetine 60 MG EC capsule   Commonly known as:  CYMBALTA   Used for:  Meralgia paresthetica of right side, Panic attack   Started by:  Benton Browne MD        Dose:  60 mg   Take 1 capsule (60 mg) by mouth daily   Quantity:  30 capsule   Refills:  2       hydrOXYzine 25 MG tablet   Commonly known as:  ATARAX   Used for:  Panic attack   Started by:  Benton Browne MD        Dose:  25-50 mg   Take 1-2 tablets  (25-50 mg) by mouth every 6 hours as needed for anxiety   Quantity:  60 tablet   Refills:  1         These medicines have changed or have updated prescriptions.        Dose/Directions    * varenicline 1 MG tablet   Commonly known as:  CHANTIX   This may have changed:  Another medication with the same name was added. Make sure you understand how and when to take each.   Used for:  Tobacco use disorder   Changed by:  Benton Browne MD        Dose:  1 mg   Take 1 tablet (1 mg) by mouth 2 times daily   Quantity:  56 tablet   Refills:  2       * varenicline 1 MG tablet   Commonly known as:  CHANTIX   This may have changed:  You were already taking a medication with the same name, and this prescription was added. Make sure you understand how and when to take each.   Used for:  Tobacco use disorder   Changed by:  Benton Browne MD        Dose:  1 mg   Take 1 tablet (1 mg) by mouth 2 times daily   Quantity:  56 tablet   Refills:  2       * varenicline 0.5 MG X 11 & 1 MG X 42 tablet   Commonly known as:  CHANTIX STARTING MONTH PAK   This may have changed:  You were already taking a medication with the same name, and this prescription was added. Make sure you understand how and when to take each.   Used for:  Tobacco use disorder   Changed by:  Benton Browne MD        Take 0.5 mg tab daily for 3 days, then 0.5 mg tab twice daily for 4 days, then 1 mg twice daily.   Quantity:  53 tablet   Refills:  0       * Notice:  This list has 3 medication(s) that are the same as other medications prescribed for you. Read the directions carefully, and ask your doctor or other care provider to review them with you.         Where to get your medicines      These medications were sent to [facility]/pharmacy #[id number] - [place] - [address] [facility]  [address] [place] [address]     Phone:  [phone]     cyclobenzaprine 10 MG tablet    DULoxetine 60 MG EC capsule    hydrOXYzine 25  MG tablet    varenicline 0.5 MG X 11 & 1 MG X 42 tablet    varenicline 1 MG tablet    varenicline 1 MG tablet                Primary Care Provider Office Phone # Fax #    Logan Charles Dewey [phone]       [address] [place] [address]        Equal Access to Services     [facility] CenterJOHNATHAN : Hadii aad ku hadasho Soomaali, waaxda luqadaha, qaybta kaalmada adeegyada, waxay castilloin hayaan adeeg bri lagiln . So [facility] [phone].    ATENCIÓN: Si habla español, tiene a redman disposición servicios gratuitos de asistencia lingüística. [facility] [phone].    We comply with applicable federal civil rights laws and [place] laws. We do not discriminate on the basis of race, color, national origin, age, disability, sex, sexual orientation, or gender identity.            Thank you!     Thank you for choosing [facility]  for your care. Our goal is always to provide you with excellent care. Hearing back from our patients is one way we can continue to improve our services. Please take a few minutes to complete the written survey that you may receive in the mail after your visit with us. Thank you!             Your Updated Medication List - Protect others around you: Learn how to safely use, store and throw away your medicines at www.disposemymeds.org.          This list is accurate as of 6/27/18  3:18 PM.  Always use your most recent med list.                   Brand Name Dispense Instructions for use Diagnosis    ascorbic acid 1000 MG Tabs tablet     30    1 TABLET DAILY AT DINNER        aspirin 81 MG tablet     100    1 TABLET DAILY    Essential hypertension, benign       atorvastatin 80 MG tablet    LIPITOR    90 tablet    Take 1 tablet (80 mg) by mouth daily    Hyperlipidemia LDL goal <100       calcium carbonate-Vit D-Min 0458-0335 MG-UNIT Chew      1 TABLET THREE TIMES DAILY        cyclobenzaprine 10 MG tablet    FLEXERIL    270 tablet    Take 1 tablet (10 mg) by mouth 3 times daily     Chronic right-sided low back pain with right-sided sciatica, Chronic pain of right lower extremity, Nerve pain       DULoxetine 60 MG EC capsule    CYMBALTA    30 capsule    Take 1 capsule (60 mg) by mouth daily    Meralgia paresthetica of right side, Panic attack       dutasteride 0.5 MG capsule    AVODART    90 capsule    TAKE 1 CAPSULE (0.5 MG) BY MOUTH DAILY    BPH with urinary obstruction       hydrOXYzine 25 MG tablet    ATARAX    60 tablet    Take 1-2 tablets (25-50 mg) by mouth every 6 hours as needed for anxiety    Panic attack       lidocaine 5 % Patch    LIDODERM    90 patch    Apply up to 3 patches to painful area at once for up to 12 h within a 24 h period.  Remove after 12 hours.    Chronic pain of right lower extremity       lisinopril 10 MG tablet    PRINIVIL/ZESTRIL    90 tablet    Take 1 tablet (10 mg) by mouth daily    Hypertension goal BP (blood pressure) < 140/90       metoprolol succinate 50 MG 24 hr tablet    TOPROL-XL    90 tablet    Take 1 tablet (50 mg) by mouth daily    Hypertension goal BP (blood pressure) < 140/90       Multi-vitamin Tabs tablet   Generic drug:  multivitamin, therapeutic with minerals     0    1 TABLET DAILY        pantoprazole 40 MG EC tablet    PROTONIX    90 tablet    Take 1 tablet (40 mg) by mouth daily    Gastroesophageal reflux disease without esophagitis       sertraline 100 MG tablet    ZOLOFT    180 tablet    TAKE 2 TABLETS (200 MG) BY MOUTH DAILY        traZODone 100 MG tablet    DESYREL    180 tablet    TAKE 2 TABLETS BY MOUTH AT BEDTIME *DUE FOR 6 MONTH FOLLOW UP*    Insomnia due to medical condition       * varenicline 1 MG tablet    CHANTIX    56 tablet    Take 1 tablet (1 mg) by mouth 2 times daily    Tobacco use disorder       * varenicline 1 MG tablet    CHANTIX    56 tablet    Take 1 tablet (1 mg) by mouth 2 times daily    Tobacco use disorder       * varenicline 0.5 MG X 11 & 1 MG X 42 tablet    CHANTIX STARTING MONTH PAK    53 tablet    Take 0.5 mg  tab daily for 3 days, then 0.5 mg tab twice daily for 4 days, then 1 mg twice daily.    Tobacco use disorder       vitamin D 1000 units capsule     0    1 CAPSULE DAILY        * Notice:  This list has 3 medication(s) that are the same as other medications prescribed for you. Read the directions carefully, and ask your doctor or other care provider to review them with you.

## 2018-06-28 ASSESSMENT — ANXIETY QUESTIONNAIRES: GAD7 TOTAL SCORE: 13

## 2018-06-28 ASSESSMENT — PATIENT HEALTH QUESTIONNAIRE - PHQ9: SUM OF ALL RESPONSES TO PHQ QUESTIONS 1-9: 13

## 2018-06-28 NOTE — PROGRESS NOTES
Jeffy,    All of your labs were normal for you except your kidney function is worse than before.  We should recheck this at your visit next month.  Please drink plenty of fluids and avoid NSAIDs (like ibuprofen/naproxen) during that time.    Have a nice day!    Dr. Browne

## 2018-06-30 DIAGNOSIS — E78.5 HYPERLIPIDEMIA LDL GOAL <100: ICD-10-CM

## 2018-07-02 ENCOUNTER — HOSPITAL ENCOUNTER (OUTPATIENT)
Dept: MRI IMAGING | Facility: CLINIC | Age: 68
Discharge: HOME | End: 2018-07-02
Attending: FAMILY MEDICINE | Admitting: FAMILY MEDICINE
Payer: COMMERCIAL

## 2018-07-02 DIAGNOSIS — M54.41 CHRONIC RIGHT-SIDED LOW BACK PAIN WITH RIGHT-SIDED SCIATICA: ICD-10-CM

## 2018-07-02 DIAGNOSIS — G89.29 CHRONIC PAIN OF RIGHT LOWER EXTREMITY: ICD-10-CM

## 2018-07-02 DIAGNOSIS — M79.2 NERVE PAIN: ICD-10-CM

## 2018-07-02 DIAGNOSIS — G57.11 MERALGIA PARESTHETICA OF RIGHT SIDE: ICD-10-CM

## 2018-07-02 DIAGNOSIS — M79.604 CHRONIC PAIN OF RIGHT LOWER EXTREMITY: ICD-10-CM

## 2018-07-02 DIAGNOSIS — G89.29 CHRONIC RIGHT-SIDED LOW BACK PAIN WITH RIGHT-SIDED SCIATICA: ICD-10-CM

## 2018-07-02 PROCEDURE — 72148 MRI LUMBAR SPINE W/O DYE: CPT

## 2018-07-02 RX ORDER — ATORVASTATIN CALCIUM 80 MG/1
TABLET, FILM COATED ORAL
Qty: 90 TABLET | Refills: 0 | Status: SHIPPED | OUTPATIENT
Start: 2018-07-02 | End: 2018-09-14

## 2018-07-02 NOTE — PROGRESS NOTES
Jeffy,    Your MRI doesn't show anything that would likely respond to surgery, just degenerative disease of the spine (arthritis).    Thanks,    Dr. Browne

## 2018-07-02 NOTE — TELEPHONE ENCOUNTER
"Requested Prescriptions   Pending Prescriptions Disp Refills     atorvastatin (LIPITOR) 80 MG tablet [Pharmacy Med Name: ATORVASTATIN 80 MG TABLET] 90 tablet 0     Sig: TAKE 1 TABLET (80 MG) BY MOUTH DAILY    Statins Protocol Passed    6/30/2018 10:18 AM       Passed - LDL on file in past 12 months    Recent Labs   Lab Test  12/28/17   1018   LDL  75            Passed - No abnormal creatine kinase in past 12 months    Recent Labs   Lab Test  08/30/10   1220   CKT  <20*               Passed - Recent (12 mo) or future (30 days) visit within the authorizing provider's specialty    Patient had office visit in the last 12 months or has a visit in the next 30 days with authorizing provider or within the authorizing provider's specialty.  See \"Patient Info\" tab in inbasket, or \"Choose Columns\" in Meds & Orders section of the refill encounter.           Passed - Patient is age 18 or older        Prescription approved per [facility] Refill Protocol.    Abby Jordan RN    "

## 2018-07-05 ENCOUNTER — TELEPHONE (OUTPATIENT)
Dept: FAMILY MEDICINE | Facility: OTHER | Age: 68
End: 2018-07-05

## 2018-07-05 NOTE — TELEPHONE ENCOUNTER
Spoke to patient, patients wife and the pharmacy. Informed of the message below.  Ally McmanusMA)

## 2018-07-05 NOTE — TELEPHONE ENCOUNTER
[facility] pharmacy calling to get clarification on medications.   [phone]    Pt was prescribed Cymbalta last week with DJ. Pt is under the impression that he is no longer to take his Zoloft but wife is sure pt is suppose to take both medications.  Looking at the notes it says starting a trial of Cymbalta but I do not see anything about continuing or discontinuing the Zoloft.  Zoloft is still on the pt current med list.     DJ is out of the office until 7/16, pharmacy knows this. Routing to [facility] as he is the only provider in today for advice. May have to wait until DJ return or next time he pops into his inbasket (if at all)

## 2018-07-05 NOTE — TELEPHONE ENCOUNTER
In my opinion I would advise him to continue to take both medications for the next 10 days and to review this with Dr. Browne upon his return.  He may certainly arrange to see his primary care provider listed as Dr. Dewey in [place] for review as well.  Please inform pharmacy that he is to take both medications until he can sit down with primary care provider of choice.  Electronically signed:    Kirill Johnson PA-C

## 2018-07-05 NOTE — TELEPHONE ENCOUNTER
Spoke with pharmacy and relayed the information. They will  patient when he picks up his Cymbalta today.

## 2018-07-26 DIAGNOSIS — M79.604 CHRONIC PAIN OF RIGHT LOWER EXTREMITY: ICD-10-CM

## 2018-07-26 DIAGNOSIS — M54.41 CHRONIC RIGHT-SIDED LOW BACK PAIN WITH RIGHT-SIDED SCIATICA: ICD-10-CM

## 2018-07-26 DIAGNOSIS — G89.29 CHRONIC PAIN OF RIGHT LOWER EXTREMITY: ICD-10-CM

## 2018-07-26 DIAGNOSIS — M79.2 NERVE PAIN: ICD-10-CM

## 2018-07-26 DIAGNOSIS — G89.29 CHRONIC RIGHT-SIDED LOW BACK PAIN WITH RIGHT-SIDED SCIATICA: ICD-10-CM

## 2018-07-26 RX ORDER — CYCLOBENZAPRINE HCL 10 MG
TABLET ORAL
Qty: 270 TABLET | Refills: 1 | OUTPATIENT
Start: 2018-07-26

## 2018-07-26 NOTE — TELEPHONE ENCOUNTER
Requested Prescriptions   Pending Prescriptions Disp Refills     cyclobenzaprine (FLEXERIL) 10 MG tablet [Pharmacy Med Name: CYCLOBENZAPRINE HCL 10MG TABS] 270 tablet 1     Sig: TAKE ONE TABLET BY MOUTH THREE TIMES A DAY    There is no refill protocol information for this order        Last ov 06/27/2018  Last filled: 06/27/2018  cyclobenzaprine (FLEXERIL) 10 MG tablet 270 tablet 1 6/27/2018  No      Sig - Route: Take 1 tablet (10 mg) by mouth 3 times daily - Oral     This is a 6 months supply.  Should have enough. Was sent to [facility], RN, BSN

## 2018-08-06 DIAGNOSIS — I10 HYPERTENSION GOAL BP (BLOOD PRESSURE) < 140/90: ICD-10-CM

## 2018-08-06 RX ORDER — LISINOPRIL 10 MG/1
TABLET ORAL
Qty: 90 TABLET | Refills: 1 | Status: CANCELLED | OUTPATIENT
Start: 2018-08-06

## 2018-08-06 NOTE — PROGRESS NOTES
SUBJECTIVE:   Jeffy Rogers is a 67 year old male who presents to clinic today for the following health issues:      History of Present Illness     Hypertension:     Outpatient blood pressures:  Are being checked    Blood pressures checked at:  Home    Dietary sodium intake::  Not monitoring salt intake    Diet:  Regular (no restrictions)  Frequency of exercise:  2-3 days/week  Duration of exercise:  15-30 minutes  Taking medications regularly:  Yes  Medication side effects:  Other (Nausea from Chantix)  Additional concerns today:  YES (Recheck kidneys )    He has been struggling more with anxiety recently and has not been taking medications consistently to help with this.  He has had lots of stress at home and inquires about further evaluation and treatment options.  We advised counseling he already has an appointment set up and we said that we would consider medications but he is is not necessarily convinced he needs to take anything more than is ready on.  We do review his medication list and I would agree that counseling will be more than than likely more effective than medications will be.    Problem list and histories reviewed & adjusted, as indicated.  Additional history: as documented    Patient Active Problem List   Diagnosis     Cardiovascular disease     Hypothyroidism, unspecified type     Congenital anomaly of lung     Left shoulder pain     Primary insomnia     MRSA (methicillin resistant staph aureus) culture positive     Hyperlipidemia LDL goal <100     Merlos esophagus     Tobacco use disorder     CKD (chronic kidney disease) stage 3, GFR 30-59 ml/min     Hypertension goal BP (blood pressure) < 140/90     Advanced directives, counseling/discussion     Major depression in complete remission (H)     Impaired fasting glucose     Panic attacks     Chronic lower back pain     SK (seborrheic keratosis)     Insomnia due to medical condition     Abnormal thyroid stimulating hormone level     Meralgia  paresthetica of right side     Chronic pain of right lower extremity     BPH with urinary obstruction     Marijuana abuse     Past Surgical History:   Procedure Laterality Date     ABDOMEN SURGERY       ANGIOGRAM       aortic iliac renal bypass  2000     BACK SURGERY       C APPENDECTOMY  1969     C NONSPECIFIC PROCEDURE  08/1999    left common iliac angioplasty and stent placement     C NONSPECIFIC PROCEDURE  11/2000    aortal femoral bypass and repair of renal artery stenosis     C VEIN IN SITU BYPASS GRAFT,FEM-POP  04/07/2003    Right femoral to above knee popliteal area bypass with 8 mm Dacron graft. Aortogram with runoff.     ESOPHAGOSCOPY, GASTROSCOPY, DUODENOSCOPY (EGD), COMBINED  11/23/2010    COMBINED ESOPHAGOSCOPY, GASTROSCOPY, DUODENOSCOPY (EGD), BIOPSY SINGLE OR MULTIPLE performed by MADDY SANCHEZ at  GI     ESOPHAGOSCOPY, GASTROSCOPY, DUODENOSCOPY (EGD), COMBINED N/A 12/18/2015    Procedure: COMBINED ESOPHAGOSCOPY, GASTROSCOPY, DUODENOSCOPY (EGD), BIOPSY SINGLE OR MULTIPLE;  Surgeon: Benton Gutiérrez MD;  Location:  GI     HC COLONOSCOPY W BIOPSY  03/10/08     HC REMOVE TONSILS/ADENOIDS,<13 Y/O      unsure of age     HC UGI ENDOSCOPY, SIMPLE EXAM  10/13/09     HERNIA REPAIR, INGUINAL RT/LT  03/30/09    Right     RENAL ARTERY ANGIOGRAM       VASCULAR SURGERY         Social History   Substance Use Topics     Smoking status: Current Every Day Smoker     Packs/day: 1.00     Years: 32.00     Types: Cigarettes     Smokeless tobacco: Never Used      Comment:  E-cigg, occasional cigs daily, hasnt smoked for a few weeks     Alcohol use Yes      Comment: occ-hardly ever 2-3 times a year     Family History   Problem Relation Age of Onset     Alcohol/Drug Mother      Depression Mother      Diabetes Maternal Grandfather      HEART DISEASE Maternal Grandfather      Diabetes Maternal Grandmother      Alcohol/Drug Brother      Alcohol/Drug Sister          Current Outpatient Prescriptions   Medication Sig  Dispense Refill     ASCORBIC ACID 1000 MG PO TABS 1 TABLET DAILY AT DINNER 30 0     ASPIRIN 81 MG OR TABS 1 TABLET DAILY 100 0     atorvastatin (LIPITOR) 80 MG tablet TAKE 1 TABLET (80 MG) BY MOUTH DAILY 90 tablet 0     CALCIUM CARBONATE-VIT D-MIN 9298-1042 MG-UNIT PO CHEW 1 TABLET THREE TIMES DAILY       cyclobenzaprine (FLEXERIL) 10 MG tablet Take 1 tablet (10 mg) by mouth 3 times daily 90 tablet 0     DULoxetine (CYMBALTA) 60 MG EC capsule Take 1 capsule (60 mg) by mouth daily 30 capsule 2     dutasteride (AVODART) 0.5 MG capsule TAKE 1 CAPSULE (0.5 MG) BY MOUTH DAILY 90 capsule 3     hydrOXYzine (ATARAX) 25 MG tablet Take 1-2 tablets (25-50 mg) by mouth every 6 hours as needed for anxiety 60 tablet 1     lidocaine (LIDODERM) 5 % Patch Apply up to 3 patches to painful area at once for up to 12 h within a 24 h period.  Remove after 12 hours. 90 patch 3     lisinopril (PRINIVIL/ZESTRIL) 10 MG tablet Take 1 tablet (10 mg) by mouth daily 90 tablet 1     metoprolol (TOPROL-XL) 50 MG 24 hr tablet Take 1 tablet (50 mg) by mouth daily 90 tablet 2     MULTI-VITAMIN OR TABS 1 TABLET DAILY  0 0     pantoprazole (PROTONIX) 40 MG EC tablet Take 1 tablet (40 mg) by mouth daily 90 tablet 3     sertraline (ZOLOFT) 100 MG tablet TAKE 2 TABLETS (200 MG) BY MOUTH DAILY 180 tablet 3     traZODone (DESYREL) 100 MG tablet TAKE 2 TABLETS BY MOUTH AT BEDTIME *DUE FOR 6 MONTH FOLLOW UP* 60 tablet 0     varenicline (CHANTIX) 1 MG tablet Take 1 tablet (1 mg) by mouth 2 times daily 56 tablet 2     VITAMIN D 1000 UNIT OR CAPS 1 CAPSULE DAILY 0 0     varenicline (CHANTIX STARTING MONTH DORIS) 0.5 MG X 11 & 1 MG X 42 tablet Take 0.5 mg tab daily for 3 days, then 0.5 mg tab twice daily for 4 days, then 1 mg twice daily. (Patient not taking: Reported on 8/9/2018) 53 tablet 0     varenicline (CHANTIX) 1 MG tablet Take 1 tablet (1 mg) by mouth 2 times daily (Patient not taking: Reported on 8/9/2018) 56 tablet 2     [DISCONTINUED] lisinopril  (PRINIVIL/ZESTRIL) 10 MG tablet Take 1 tablet (10 mg) by mouth daily 90 tablet 1     [DISCONTINUED] traZODone (DESYREL) 100 MG tablet TAKE 2 TABLETS BY MOUTH AT BEDTIME *DUE FOR 6 MONTH FOLLOW UP* 180 tablet 0     Allergies   Allergen Reactions     No Known Drug Allergies      Recent Labs   Lab Test  06/27/18   1525  12/28/17   1018  06/02/17   1615  12/05/16   1601   11/20/15   1045   04/21/11   1241  12/08/10   1202   LDL   --   75   --   76   --   51   < >  107  63   HDL   --   39*   --   42   --   40   < >  49  44   TRIG   --   90   --   147   --   111   < >  134  96   ALT   --    --   20   --    --    --    --   16  12   CR  1.61*   --   1.17  1.09   < >  0.96   < >   --    --    GFRESTIMATED  43*   --   62  68   < >  79   < >   --    --    GFRESTBLACK  52*   --   76  82   < >  >90   GFR Calc     < >   --    --    POTASSIUM  3.8   --   5.1  4.1   < >  4.4   < >   --    --    TSH  0.62   --   0.51  0.08*   --   0.50   < >   --    --     < > = values in this interval not displayed.      BP Readings from Last 3 Encounters:   08/09/18 146/90   06/27/18 154/78   03/28/18 130/78    Wt Readings from Last 3 Encounters:   08/09/18 173 lb 9.6 oz (78.7 kg)   06/27/18 177 lb 3.2 oz (80.4 kg)   03/28/18 172 lb (78 kg)                    ROS:  Constitutional, HEENT, cardiovascular, pulmonary, gi and gu systems are negative, except as otherwise noted.    OBJECTIVE:     /90 (Cuff Size: Adult Regular)  Pulse 88  Temp 98.7  F (37.1  C) (Temporal)  Resp 20  Wt 173 lb 9.6 oz (78.7 kg)  BMI 24.91 kg/m2  Body mass index is 24.91 kg/(m^2).  GENERAL: healthy, alert and no distress  RESP: lungs clear to auscultation - no rales, rhonchi or wheezes  CV: regular rate and rhythm, normal S1 S2, no S3 or S4, no murmur, click or rub, no peripheral edema and peripheral pulses strong  MS: no gross musculoskeletal defects noted, no edema  PSYCH: fatigued, speech pressured, judgement and insight intact and appearance  well groomed    Diagnostic Test Results:  No results found for this or any previous visit (from the past 24 hour(s)).    ASSESSMENT/PLAN:     1. Insomnia due to medical condition  This is medications are significant enough that I have deferred a 3 month refill to Dr. Browne's discretion.  - traZODone (DESYREL) 100 MG tablet; TAKE 2 TABLETS BY MOUTH AT BEDTIME *DUE FOR 6 MONTH FOLLOW UP*  Dispense: 60 tablet; Refill: 0    2. CKD (chronic kidney disease) stage 3, GFR 30-59 ml/min  Further evaluation of this to be rechecked today.  Evidently he has some kidney atrophy on the left.  - Comprehensive metabolic panel (BMP + Alb, Alk Phos, ALT, AST, Total. Bili, TP)    3. Hypertension goal BP (blood pressure) < 140/90  Not in excellent control this point in time refilled medications and advised that he follow-up with his primary care provider as soon as practical.  We will be taken look at kidney and liver function today.  - lisinopril (PRINIVIL/ZESTRIL) 10 MG tablet; Take 1 tablet (10 mg) by mouth daily  Dispense: 90 tablet; Refill: 1    4. Hypothyroidism, unspecified type  With his fatigue I suggest that this is more related to overall mental health problems but he would like to have his thyroid rechecked today anyway.  - TSH with free T4 reflex    5. Panic attacks  We advised that he be seen by his primary care provider in follow-up in fairly short order.    6. Chronic right-sided low back pain with right-sided sciatica  7. Chronic pain of right lower extremity  8. Nerve pain  Refill as noted.  No further refills without office visit.  - cyclobenzaprine (FLEXERIL) 10 MG tablet; Take 1 tablet (10 mg) by mouth 3 times daily  Dispense: 90 tablet; Refill: 0    He is to follow-up within the next month with his primary care provider of choice.    Kirill Johnson PA-C  [facility] for HPI/ROS submitted by the patient on 8/9/2018   If you checked off any problems, how difficult have these problems made it for  you to do your work, take care of things at home, or get along with other people?: Somewhat difficult  PHQ9 TOTAL SCORE: 12

## 2018-08-07 NOTE — TELEPHONE ENCOUNTER
Lisinopril  Routing refill request to provider for review/approval because:  Labs out of range:  BP and creatinine    Next 5 appointments (look out 90 days)     Aug 09, 2018  2:00 PM CDT   Office Visit with Kirill Weller PA-C   [facility] ([facility])    [address] [address] [phone]                Sofia Newsome RN, BSN

## 2018-08-08 ENCOUNTER — TELEPHONE (OUTPATIENT)
Dept: FAMILY MEDICINE | Facility: OTHER | Age: 68
End: 2018-08-08

## 2018-08-08 DIAGNOSIS — G47.01 INSOMNIA DUE TO MEDICAL CONDITION: ICD-10-CM

## 2018-08-08 RX ORDER — TRAZODONE HYDROCHLORIDE 100 MG/1
TABLET ORAL
Qty: 180 TABLET | Refills: 0 | Status: CANCELLED | OUTPATIENT
Start: 2018-08-08

## 2018-08-08 NOTE — TELEPHONE ENCOUNTER
Reason for call:  Medication  Reason for Call:  Medication or medication refill:    Do you use a [facility]?  Name of the pharmacy and phone number for the current request:  [facility] - [phone]    Name of the medication requested: Trazadone    Other request: none    Can we leave a detailed message on this number? YES    Phone number patient can be reached at: Cell number on file:    Telephone Information:   Mobile [phone]       Best Time: anytime    Call taken on 8/8/2018 at 1:03 PM by Yariel March

## 2018-08-09 ENCOUNTER — OFFICE VISIT (OUTPATIENT)
Dept: FAMILY MEDICINE | Facility: OTHER | Age: 68
End: 2018-08-09
Payer: COMMERCIAL

## 2018-08-09 ENCOUNTER — TELEPHONE (OUTPATIENT)
Dept: FAMILY MEDICINE | Facility: OTHER | Age: 68
End: 2018-08-09

## 2018-08-09 VITALS
BODY MASS INDEX: 24.91 KG/M2 | DIASTOLIC BLOOD PRESSURE: 90 MMHG | WEIGHT: 173.6 LBS | SYSTOLIC BLOOD PRESSURE: 146 MMHG | HEART RATE: 88 BPM | RESPIRATION RATE: 20 BRPM | TEMPERATURE: 98.7 F

## 2018-08-09 DIAGNOSIS — M79.604 CHRONIC PAIN OF RIGHT LOWER EXTREMITY: ICD-10-CM

## 2018-08-09 DIAGNOSIS — G89.29 CHRONIC PAIN OF RIGHT LOWER EXTREMITY: ICD-10-CM

## 2018-08-09 DIAGNOSIS — I10 HYPERTENSION GOAL BP (BLOOD PRESSURE) < 140/90: ICD-10-CM

## 2018-08-09 DIAGNOSIS — N18.30 CKD (CHRONIC KIDNEY DISEASE) STAGE 3, GFR 30-59 ML/MIN (H): Primary | ICD-10-CM

## 2018-08-09 DIAGNOSIS — G47.01 INSOMNIA DUE TO MEDICAL CONDITION: ICD-10-CM

## 2018-08-09 DIAGNOSIS — M79.2 NERVE PAIN: ICD-10-CM

## 2018-08-09 DIAGNOSIS — M54.41 CHRONIC RIGHT-SIDED LOW BACK PAIN WITH RIGHT-SIDED SCIATICA: ICD-10-CM

## 2018-08-09 DIAGNOSIS — F41.0 PANIC ATTACKS: ICD-10-CM

## 2018-08-09 DIAGNOSIS — G89.29 CHRONIC RIGHT-SIDED LOW BACK PAIN WITH RIGHT-SIDED SCIATICA: ICD-10-CM

## 2018-08-09 DIAGNOSIS — E03.9 HYPOTHYROIDISM, UNSPECIFIED TYPE: ICD-10-CM

## 2018-08-09 LAB
ALBUMIN SERPL-MCNC: 4 G/DL (ref 3.4–5)
ALP SERPL-CCNC: 105 U/L (ref 40–150)
ALT SERPL W P-5'-P-CCNC: 23 U/L (ref 0–70)
ANION GAP SERPL CALCULATED.3IONS-SCNC: 11 MMOL/L (ref 3–14)
AST SERPL W P-5'-P-CCNC: 18 U/L (ref 0–45)
BILIRUB SERPL-MCNC: 0.4 MG/DL (ref 0.2–1.3)
BUN SERPL-MCNC: 13 MG/DL (ref 7–30)
CALCIUM SERPL-MCNC: 9.3 MG/DL (ref 8.5–10.1)
CHLORIDE SERPL-SCNC: 105 MMOL/L (ref 94–109)
CO2 SERPL-SCNC: 24 MMOL/L (ref 20–32)
CREAT SERPL-MCNC: 1.1 MG/DL (ref 0.66–1.25)
GFR SERPL CREATININE-BSD FRML MDRD: 67 ML/MIN/1.7M2
GLUCOSE SERPL-MCNC: 94 MG/DL (ref 70–99)
POTASSIUM SERPL-SCNC: 4.2 MMOL/L (ref 3.4–5.3)
PROT SERPL-MCNC: 8.1 G/DL (ref 6.8–8.8)
SODIUM SERPL-SCNC: 140 MMOL/L (ref 133–144)
TSH SERPL DL<=0.005 MIU/L-ACNC: 0.61 MU/L (ref 0.4–4)

## 2018-08-09 PROCEDURE — 80053 COMPREHEN METABOLIC PANEL: CPT | Performed by: PHYSICIAN ASSISTANT

## 2018-08-09 PROCEDURE — 99214 OFFICE O/P EST MOD 30 MIN: CPT | Performed by: PHYSICIAN ASSISTANT

## 2018-08-09 PROCEDURE — 36415 COLL VENOUS BLD VENIPUNCTURE: CPT | Performed by: PHYSICIAN ASSISTANT

## 2018-08-09 PROCEDURE — 84443 ASSAY THYROID STIM HORMONE: CPT | Performed by: PHYSICIAN ASSISTANT

## 2018-08-09 RX ORDER — LISINOPRIL 10 MG/1
10 TABLET ORAL DAILY
Qty: 90 TABLET | Refills: 1 | Status: SHIPPED | OUTPATIENT
Start: 2018-08-09 | End: 2018-09-14

## 2018-08-09 RX ORDER — CYCLOBENZAPRINE HCL 10 MG
10 TABLET ORAL 3 TIMES DAILY
Qty: 90 TABLET | Refills: 0 | Status: SHIPPED | OUTPATIENT
Start: 2018-08-09 | End: 2018-09-14

## 2018-08-09 RX ORDER — TRAZODONE HYDROCHLORIDE 100 MG/1
TABLET ORAL
Qty: 60 TABLET | Refills: 0 | Status: SHIPPED | OUTPATIENT
Start: 2018-08-09 | End: 2018-09-12

## 2018-08-09 ASSESSMENT — PAIN SCALES - GENERAL: PAINLEVEL: SEVERE PAIN (6)

## 2018-08-09 ASSESSMENT — PATIENT HEALTH QUESTIONNAIRE - PHQ9
10. IF YOU CHECKED OFF ANY PROBLEMS, HOW DIFFICULT HAVE THESE PROBLEMS MADE IT FOR YOU TO DO YOUR WORK, TAKE CARE OF THINGS AT HOME, OR GET ALONG WITH OTHER PEOPLE: SOMEWHAT DIFFICULT
SUM OF ALL RESPONSES TO PHQ QUESTIONS 1-9: 12
SUM OF ALL RESPONSES TO PHQ QUESTIONS 1-9: 12

## 2018-08-09 NOTE — MR AVS SNAPSHOT
After Visit Summary   8/9/2018    Jeffy Rogers    MRN: [id number]           Patient Information     Date Of Birth          1950        Visit Information        Provider Department      8/9/2018 2:00 PM Kirill Weller PA-C [facility]        Today's Diagnoses     CKD (chronic kidney disease) stage 3, GFR 30-59 ml/min    -  1    Insomnia due to medical condition        Hypertension goal BP (blood pressure) < 140/90        Hypothyroidism, unspecified type        Panic attacks        Chronic right-sided low back pain with right-sided sciatica        Chronic pain of right lower extremity        Nerve pain           Follow-ups after your visit        Who to contact     If you have questions or need follow up information about today's clinic visit or your schedule please contact [facility] directly at [phone].  Normal or non-critical lab and imaging results will be communicated to you by MyChart, letter or phone within 4 business days after the clinic has received the results. If you do not hear from us within 7 days, please contact the clinic through [employer] or phone. If you have a critical or abnormal lab result, we will notify you by phone as soon as possible.  Submit refill requests through [employer] or call your pharmacy and they will forward the refill request to us. Please allow 3 business days for your refill to be completed.          Additional Information About Your Visit        MyChart Information     [employer] gives you secure access to your electronic health record. If you see a primary care provider, you can also send messages to your care team and make appointments. If you have questions, please call your primary care clinic.  If you do not have a primary care provider, please call [phone] and they will assist you.        Care EveryWhere ID     This is your Care EveryWhere ID. This could be used by other organizations to access your [facility]  records  [id number]        Your Vitals Were     Pulse Temperature Respirations BMI (Body Mass Index)          88 98.7  F (37.1  C) (Temporal) 20 24.91 kg/m2         Blood Pressure from Last 3 Encounters:   08/09/18 146/90   06/27/18 154/78   03/28/18 130/78    Weight from Last 3 Encounters:   08/09/18 173 lb 9.6 oz (78.7 kg)   06/27/18 177 lb 3.2 oz (80.4 kg)   03/28/18 172 lb (78 kg)              We Performed the Following     Comprehensive metabolic panel (BMP + Alb, Alk Phos, ALT, AST, Total. Bili, TP)     TSH with free T4 reflex          Where to get your medicines      These medications were sent to [facility] - [place] - [address]   [address] [place] [address]     Phone:  [phone]     cyclobenzaprine 10 MG tablet    lisinopril 10 MG tablet    traZODone 100 MG tablet          Primary Care Provider Office Phone # Fax #    Logan Charles Dewey [phone]       [address] [place] [address]        Equal Access to Services     [facility] HospitalJOHNATHAN AH: Hadii aad ku hadasho Soemelyali, waaxda luqadaha, qaybta kaalmada adeegyada, jeffrey leal. So [facility] [phone].    ATENCIÓN: Si habla español, tiene a redman disposición servicios gratuitos de asistencia lingüística. [facility] [phone].    We comply with applicable federal civil rights laws and [place] laws. We do not discriminate on the basis of race, color, national origin, age, disability, sex, sexual orientation, or gender identity.            Thank you!     Thank you for choosing [facility]  for your care. Our goal is always to provide you with excellent care. Hearing back from our patients is one way we can continue to improve our services. Please take a few minutes to complete the written survey that you may receive in the mail after your visit with us. Thank you!             Your Updated Medication List - Protect others around you: Learn how to safely  use, store and throw away your medicines at www.disposemymeds.org.          This list is accurate as of 8/9/18  2:39 PM.  Always use your most recent med list.                   Brand Name Dispense Instructions for use Diagnosis    ascorbic acid 1000 MG Tabs tablet     30    1 TABLET DAILY AT DINNER        aspirin 81 MG tablet     100    1 TABLET DAILY    Essential hypertension, benign       atorvastatin 80 MG tablet    LIPITOR    90 tablet    TAKE 1 TABLET (80 MG) BY MOUTH DAILY    Hyperlipidemia LDL goal <100       calcium carbonate-Vit D-Min 3469-6534 MG-UNIT Chew      1 TABLET THREE TIMES DAILY        cyclobenzaprine 10 MG tablet    FLEXERIL    90 tablet    Take 1 tablet (10 mg) by mouth 3 times daily    Chronic right-sided low back pain with right-sided sciatica, Chronic pain of right lower extremity, Nerve pain       DULoxetine 60 MG EC capsule    CYMBALTA    30 capsule    Take 1 capsule (60 mg) by mouth daily    Meralgia paresthetica of right side, Panic attack       dutasteride 0.5 MG capsule    AVODART    90 capsule    TAKE 1 CAPSULE (0.5 MG) BY MOUTH DAILY    BPH with urinary obstruction       hydrOXYzine 25 MG tablet    ATARAX    60 tablet    Take 1-2 tablets (25-50 mg) by mouth every 6 hours as needed for anxiety    Panic attack       lidocaine 5 % Patch    LIDODERM    90 patch    Apply up to 3 patches to painful area at once for up to 12 h within a 24 h period.  Remove after 12 hours.    Chronic pain of right lower extremity       lisinopril 10 MG tablet    PRINIVIL/ZESTRIL    90 tablet    Take 1 tablet (10 mg) by mouth daily    Hypertension goal BP (blood pressure) < 140/90       metoprolol succinate 50 MG 24 hr tablet    TOPROL-XL    90 tablet    Take 1 tablet (50 mg) by mouth daily    Hypertension goal BP (blood pressure) < 140/90       Multi-vitamin Tabs tablet   Generic drug:  multivitamin, therapeutic with minerals     0    1 TABLET DAILY        pantoprazole 40 MG EC tablet    PROTONIX    90  tablet    Take 1 tablet (40 mg) by mouth daily    Gastroesophageal reflux disease without esophagitis       sertraline 100 MG tablet    ZOLOFT    180 tablet    TAKE 2 TABLETS (200 MG) BY MOUTH DAILY        traZODone 100 MG tablet    DESYREL    60 tablet    TAKE 2 TABLETS BY MOUTH AT BEDTIME *DUE FOR 6 MONTH FOLLOW UP*    Insomnia due to medical condition       * varenicline 1 MG tablet    CHANTIX    56 tablet    Take 1 tablet (1 mg) by mouth 2 times daily    Tobacco use disorder       * varenicline 1 MG tablet    CHANTIX    56 tablet    Take 1 tablet (1 mg) by mouth 2 times daily    Tobacco use disorder       * varenicline 0.5 MG X 11 & 1 MG X 42 tablet    CHANTIX STARTING MONTH DORIS    53 tablet    Take 0.5 mg tab daily for 3 days, then 0.5 mg tab twice daily for 4 days, then 1 mg twice daily.    Tobacco use disorder       vitamin D 1000 units capsule     0    1 CAPSULE DAILY        * Notice:  This list has 3 medication(s) that are the same as other medications prescribed for you. Read the directions carefully, and ask your doctor or other care provider to review them with you.

## 2018-08-10 ASSESSMENT — PATIENT HEALTH QUESTIONNAIRE - PHQ9: SUM OF ALL RESPONSES TO PHQ QUESTIONS 1-9: 12

## 2018-08-10 NOTE — TELEPHONE ENCOUNTER
"Requested Prescriptions   Pending Prescriptions Disp Refills     traZODone (DESYREL) 100 MG tablet [Pharmacy Med Name: TRAZODONE 100 MG TABLET] 180 tablet 0     Sig: TAKE 2 TABLETS BY MOUTH AT BEDTIME *DUE FOR 6 MONTH FOLLOW UP*    Serotonin Modulators Passed    8/9/2018  3:00 PM       Passed - Recent (12 mo) or future (30 days) visit within the authorizing provider's specialty    Patient had office visit in the last 12 months or has a visit in the next 30 days with authorizing provider or within the authorizing provider's specialty.  See \"Patient Info\" tab in inbasket, or \"Choose Columns\" in Meds & Orders section of the refill encounter.           Passed - Patient is age 18 or older        Last ov 08/09/2018  Last filled yesterday[facility]with [facility]  Pharmacy note states that he is taking 3 at bedtime.  Flag for provider to review.    Eduardo Tyler, RN, BSN              "

## 2018-08-11 DIAGNOSIS — I10 HYPERTENSION GOAL BP (BLOOD PRESSURE) < 140/90: ICD-10-CM

## 2018-08-13 RX ORDER — LISINOPRIL 10 MG/1
TABLET ORAL
Qty: 90 TABLET | Refills: 1 | OUTPATIENT
Start: 2018-08-13

## 2018-08-13 RX ORDER — TRAZODONE HYDROCHLORIDE 100 MG/1
TABLET ORAL
Qty: 90 TABLET | Refills: 0 | Status: SHIPPED | OUTPATIENT
Start: 2018-08-13 | End: 2018-09-12

## 2018-08-13 NOTE — TELEPHONE ENCOUNTER
Lisinopril:   Sent on 08/09/2018 with 6 month supply. Refill not appropriate at this time.   Nancy Chaudhry, MEÑO, BSN

## 2018-08-17 NOTE — TELEPHONE ENCOUNTER
As stated in my note from last physical exam he will need to see his primary care provider of choice for 3 month refill.  At the time of my visit that PCP was listed as Dr. Bronwe.  It would appear that he has changed this to Logan Dewey in [employer].  I will not be giving a 3 month refill of trazodone at the level that he is currently taking.  Electronically signed:    Kirill Johnson PA-C

## 2018-08-17 NOTE — TELEPHONE ENCOUNTER
Patient has not seen me in almost 2 years.  He has seen Dr. Browne fairly regularly since then and has had his trazodone refilled numerous times by him.  I am not sure why I am still listed as the patient's primary provider.    Will send to Dr. Browne for review.    Logan Dewey MD

## 2018-08-17 NOTE — TELEPHONE ENCOUNTER
Per fax from [facility] pharmacy, insurance requires 90 day supply for Trazodone 100 mg, please send revised/new Rx if appropriate  Thank you  Ilda ENRIQUEZ (R)

## 2018-08-17 NOTE — TELEPHONE ENCOUNTER
Routing to PCP, please see notes below, patient is requesting 90 day supply for insurance.   Thanks  Ilda ENRIQUEZ (R)

## 2018-08-20 DIAGNOSIS — I10 HYPERTENSION GOAL BP (BLOOD PRESSURE) < 140/90: ICD-10-CM

## 2018-08-20 NOTE — TELEPHONE ENCOUNTER
Spoke with pharmacy, patient has not picked up Rx yet, pharmacy will let him know he needs an appointment for 90 day supply  Closing encounter  Ilda Bryant RT (R)

## 2018-08-20 NOTE — TELEPHONE ENCOUNTER
Pt apparently changed PCP listed from me to Dr. Dewey.  I had asked pt to follow up in one month when I saw him in June.  Kirill also recommended he follow up with PCP of his choice in one month from when he saw him last week.  He was given a one-month supply of his medication at that time.  He shouldn't need a refill at this time.  Pt needs to schedule follow up with PCP of his choice.

## 2018-08-21 RX ORDER — LISINOPRIL 10 MG/1
TABLET ORAL
Qty: 90 TABLET | Refills: 1 | OUTPATIENT
Start: 2018-08-21

## 2018-08-21 NOTE — TELEPHONE ENCOUNTER
Lisinopril    Sent 8/9/2018 with 6 month supply. Refill not appropriate at this time.       Ivania Frances, RN, BSN

## 2018-08-23 DIAGNOSIS — F41.0 PANIC ATTACK: ICD-10-CM

## 2018-08-23 DIAGNOSIS — G57.11 MERALGIA PARESTHETICA OF RIGHT SIDE: ICD-10-CM

## 2018-08-24 RX ORDER — DULOXETIN HYDROCHLORIDE 60 MG/1
60 CAPSULE, DELAYED RELEASE ORAL DAILY
Qty: 30 CAPSULE | Refills: 2 | OUTPATIENT
Start: 2018-08-24

## 2018-08-24 NOTE — TELEPHONE ENCOUNTER
Cymbalta    BP Readings from Last 3 Encounters:   08/09/18 146/90   06/27/18 154/78   03/28/18 130/78     Sent 6/27/2018 with 3 month supply. Refill not appropriate at this time.     Ivania Frances, RN, BSN

## 2018-08-27 DIAGNOSIS — I10 HYPERTENSION GOAL BP (BLOOD PRESSURE) < 140/90: ICD-10-CM

## 2018-08-27 RX ORDER — DULOXETIN HYDROCHLORIDE 60 MG/1
60 CAPSULE, DELAYED RELEASE ORAL DAILY
Qty: 90 CAPSULE | Refills: 0 | Status: SHIPPED | OUTPATIENT
Start: 2018-08-27 | End: 2018-09-14

## 2018-08-27 NOTE — TELEPHONE ENCOUNTER
[facility] pharmacy calling stating patient is out of medication, he last filled in July for 30 days and now insurance is requesting 90 day supply, patient has appointment on 9/14/2018 with Dr Browne, please advise if refill appropriate, thanks  Ilda Bryant RT (R)

## 2018-08-28 RX ORDER — LISINOPRIL 10 MG/1
TABLET ORAL
Qty: 90 TABLET | Refills: 1 | OUTPATIENT
Start: 2018-08-28

## 2018-09-10 NOTE — PROGRESS NOTES
SUBJECTIVE:   Jeffy Rogers is a 67 year old male who presents to clinic today for the following health issues:    The 10-year ASCVD risk score (Georgiacindi FERNANDO Jr, et al., 2013) is: 24.4%    Values used to calculate the score:      Age: 67 years      Sex: Male      Is Non- : No      Diabetic: No      Tobacco smoker: Yes      Systolic Blood Pressure: 152 mmHg      Is BP treated: Yes      HDL Cholesterol: 39 mg/dL      Total Cholesterol: 132 mg/dL  Patient is eligible for use of low-dose aspirin for primary prevention of heart attack and stroke.  Provider has discussed aspirin with patient and our decision was:     Prescribe:  Daily low-dose aspirin recommended for primary prevention, patient agrees with plan.        History of Present Illness     CKD:     NSAID use::  None    Depression & Anxiety Follow-up:     Depression/Anxiety:  Depression only    Status since last visit::  Stable    Other associated symptoms of depression::  None    Significant life event::  No    Current substance use::  None    Anxiety/Panic symptoms::  YES       Today's PHQ-9         PHQ-9 Total Score:         PHQ-9 Q9 Suicidal ideation:       Thoughts of suicide or self harm:      Self-harm Plan:        Self-harm Action:          Safety concerns for self or others:            Hyperlipidemia:     Low fat/chol diet rating::  Good    Taking Statins::  YES    Lipid Medications or Supplements::  Fish oil/Omega 3, without side effects.    Hypertension:     Outpatient blood pressures:  Are being checked    Blood pressures checked at:  Home    Dietary sodium intake::  Not monitoring salt intake    Diet:  Regular (no restrictions)  Frequency of exercise:  2-3 days/week  Duration of exercise:  Greater than 60 minutes  Taking medications regularly:  Yes  Medication side effects:  Not applicable  Additional concerns today:  No    Pt's mood is worse of late.  Doesn't feel much better than before.  Continues on sertraline, Cymbalta,  hydroxyzine, and trazodone.  PHQ-9 SCORE 6/27/2018 8/9/2018 9/14/2018   Total Score - - -   Total Score MyChart 13 (Moderate depression) 12 (Moderate depression) 6 (Mild depression)   Total Score 13 12 6     Reflux is controlled.      BP has been pretty good.  Denies side effects from medications.      Cymbalta has cut the pain in his thighs a bit.  He's not sure about the benefit from the Lidoderm patches.  Gabapentin and Lyrica didn't help much.  He had a nerve block done at [facility], which lasted about 3 weeks.  Fairly good during the 3 weeks.      Still smoking, down to 2-3/day.      Denies sweats, fevers, agitation, etc.      Problem list and histories reviewed & adjusted, as indicated.  Additional history: as documented        Current Outpatient Prescriptions   Medication Sig Dispense Refill     ASCORBIC ACID 1000 MG PO TABS 1 TABLET DAILY AT DINNER 30 0     ASPIRIN 81 MG OR TABS 1 TABLET DAILY 100 0     atorvastatin (LIPITOR) 80 MG tablet TAKE 1 TABLET (80 MG) BY MOUTH DAILY 90 tablet 0     CALCIUM CARBONATE-VIT D-MIN 3600-4423 MG-UNIT PO CHEW 1 TABLET THREE TIMES DAILY       cyclobenzaprine (FLEXERIL) 10 MG tablet Take 1 tablet (10 mg) by mouth 3 times daily 90 tablet 0     DULoxetine (CYMBALTA) 60 MG EC capsule Take 1 capsule (60 mg) by mouth daily 90 capsule 0     dutasteride (AVODART) 0.5 MG capsule TAKE 1 CAPSULE (0.5 MG) BY MOUTH DAILY 90 capsule 3     hydrOXYzine (ATARAX) 25 MG tablet Take 1-2 tablets (25-50 mg) by mouth every 6 hours as needed for anxiety 60 tablet 1     lidocaine (LIDODERM) 5 % Patch Apply up to 3 patches to painful area at once for up to 12 h within a 24 h period.  Remove after 12 hours. 90 patch 3     lisinopril (PRINIVIL/ZESTRIL) 10 MG tablet Take 1 tablet (10 mg) by mouth daily 90 tablet 1     metoprolol (TOPROL-XL) 50 MG 24 hr tablet Take 1 tablet (50 mg) by mouth daily 90 tablet 2     MULTI-VITAMIN OR TABS 1 TABLET DAILY  0 0     pantoprazole (PROTONIX) 40 MG EC tablet Take 1  tablet (40 mg) by mouth daily 90 tablet 3     sertraline (ZOLOFT) 100 MG tablet TAKE 2 TABLETS (200 MG) BY MOUTH DAILY 180 tablet 3     traZODone (DESYREL) 100 MG tablet TAKE 2 TABLETS BY MOUTH AT BEDTIME *DUE FOR 6 MONTH FOLLOW UP* 90 tablet 0     varenicline (CHANTIX STARTING MONTH DORIS) 0.5 MG X 11 & 1 MG X 42 tablet Take 0.5 mg tab daily for 3 days, then 0.5 mg tab twice daily for 4 days, then 1 mg twice daily. 53 tablet 0     varenicline (CHANTIX) 1 MG tablet Take 1 tablet (1 mg) by mouth 2 times daily 56 tablet 2     varenicline (CHANTIX) 1 MG tablet Take 1 tablet (1 mg) by mouth 2 times daily 56 tablet 2     VITAMIN D 1000 UNIT OR CAPS 1 CAPSULE DAILY 0 0     Recent Labs   Lab Test  08/09/18   1441  06/27/18   1525  12/28/17   1018  06/02/17   1615  12/05/16   1601   11/20/15   1045   04/21/11   1241   LDL   --    --   75   --   76   --   51   < >  107   HDL   --    --   39*   --   42   --   40   < >  49   TRIG   --    --   90   --   147   --   111   < >  134   ALT  23   --    --   20   --    --    --    --   16   CR  1.10  1.61*   --   1.17  1.09   < >  0.96   < >   --    GFRESTIMATED  67  43*   --   62  68   < >  79   < >   --    GFRESTBLACK  81  52*   --   76  82   < >  >90   GFR Calc     < >   --    POTASSIUM  4.2  3.8   --   5.1  4.1   < >  4.4   < >   --    TSH  0.61  0.62   --   0.51  0.08*   --   0.50   < >   --     < > = values in this interval not displayed.      BP Readings from Last 3 Encounters:   09/14/18 152/88   08/09/18 146/90   06/27/18 154/78    Wt Readings from Last 3 Encounters:   09/14/18 178 lb 1.6 oz (80.8 kg)   08/09/18 173 lb 9.6 oz (78.7 kg)   06/27/18 177 lb 3.2 oz (80.4 kg)                  ROS:  Constitutional, HEENT, cardiovascular, pulmonary, gi and gu systems are negative, except as otherwise noted.    OBJECTIVE:     /88 (BP Location: Left arm, Patient Position: Chair, Cuff Size: Adult Regular)  Pulse 88  Temp 97.5  F (36.4  C) (Temporal)  Resp 16  Wt  178 lb 1.6 oz (80.8 kg)  BMI 25.55 kg/m2  Body mass index is 25.55 kg/(m^2).  GENERAL: healthy, alert and no distress  NECK: no adenopathy, no asymmetry, masses, or scars and thyroid normal to palpation  RESP: lungs clear to auscultation - no rales, rhonchi or wheezes  CV: regular rate and rhythm, normal S1 S2, no S3 or S4, no murmur, click or rub, no peripheral edema and peripheral pulses strong  ABDOMEN: soft, nontender, no hepatosplenomegaly, no masses and bowel sounds normal  MS: no gross musculoskeletal defects noted, no edema    Diagnostic Test Results:  Results for orders placed or performed in visit on 08/09/18   Comprehensive metabolic panel (BMP + Alb, Alk Phos, ALT, AST, Total. Bili, TP)   Result Value Ref Range    Sodium 140 133 - 144 mmol/L    Potassium 4.2 3.4 - 5.3 mmol/L    Chloride 105 94 - 109 mmol/L    Carbon Dioxide 24 20 - 32 mmol/L    Anion Gap 11 3 - 14 mmol/L    Glucose 94 70 - 99 mg/dL    Urea Nitrogen 13 7 - 30 mg/dL    Creatinine 1.10 0.66 - 1.25 mg/dL    GFR Estimate 67 >60 mL/min/1.7m2    GFR Estimate If Black 81 >60 mL/min/1.7m2    Calcium 9.3 8.5 - 10.1 mg/dL    Bilirubin Total 0.4 0.2 - 1.3 mg/dL    Albumin 4.0 3.4 - 5.0 g/dL    Protein Total 8.1 6.8 - 8.8 g/dL    Alkaline Phosphatase 105 40 - 150 U/L    ALT 23 0 - 70 U/L    AST 18 0 - 45 U/L   TSH with free T4 reflex   Result Value Ref Range    TSH 0.61 0.40 - 4.00 mU/L       ASSESSMENT/PLAN:     Tobacco Cessation:   reports that he has been smoking Cigarettes.  He has a 32.00 pack-year smoking history. He has never used smokeless tobacco.  Tobacco Cessation Action Plan: Information offered: Patient not interested at this time        ICD-10-CM    1. Meralgia paresthetica of right side G57.11 DULoxetine (CYMBALTA) 60 MG EC capsule   2. Major depression in complete remission (H) F32.5    3. Hypertension goal BP (blood pressure) < 140/90 I10 lisinopril (PRINIVIL/ZESTRIL) 10 MG tablet   4. Insomnia due to medical condition G47.01  traZODone (DESYREL) 100 MG tablet   5. Panic attack F41.0 hydrOXYzine (ATARAX) 25 MG tablet     DULoxetine (CYMBALTA) 60 MG EC capsule   6. Tobacco use disorder F17.200 varenicline (CHANTIX) 1 MG tablet   7. Chronic right-sided low back pain with right-sided sciatica M54.41 cyclobenzaprine (FLEXERIL) 10 MG tablet    G89.29    8. Chronic pain of right lower extremity M79.604 cyclobenzaprine (FLEXERIL) 10 MG tablet    G89.29    9. Nerve pain M79.2 cyclobenzaprine (FLEXERIL) 10 MG tablet   10. Hyperlipidemia LDL goal <100 E78.5 Lipid panel reflex to direct LDL Fasting     Comprehensive metabolic panel     atorvastatin (LIPITOR) 80 MG tablet   11. Gastroesophageal reflux disease without esophagitis K21.9 pantoprazole (PROTONIX) 40 MG EC tablet   12. BPH with urinary obstruction N40.1 dutasteride (AVODART) 0.5 MG capsule    N13.8    13. Need for vaccination Z23 Pneumococcal vaccine 23 valent PPSV23  (Pneumovax) [73751]     ADMIN: Vaccine, Initial (35519)     1, 7, 8, 9.  Mild improvement of his symptoms.  He would like to continue duloxetine for this.  He has not tolerated gabapentin or Lyrica in the past for this.  I discussed possible referral for another injection or possibly radiofrequency ablation, but patient is not imaged in pursuing that at this time.  He will continue to manage as he has.  Follow-up in 3-6 months.  2, 5.  Not fully controlled.  But overall improved from previously.  Will continue his current medications.  If not improving over time, we could consider an atypical antipsychotic.  I did discuss the combined serotonergic effects of many of his medications.  These put him at risk for serotonin syndrome.  He has no symptoms of this.  We discussed signs and symptoms that he should monitor for area if these occur, would recommend he stop 1 of his serotonergic agents and contact me immediately or proceed to the ER.  3.  Borderline control.  Will continue current regimen and monitoring at home.  Follow-up  in 6 months.  4.  Currently controlled.  Continue current regimen.  6.  Strongly encouraged him to quit smoking.  He is dramatically cut down.  We will continue his Chantix at this time.  Follow-up in 3 months.  10.  Currently controlled.  Check labs continue current regimen.  11.  Currently controlled.  Continue current regimen.  12.  Currently controlled.  Continue current regimen.  13.  Immunized.    Portions of this note were completed using Dragon dictation software.  Although reviewed, there may be typographical and other inadvertent errors that remain.             Patient Instructions   Thank you for visiting [facility]    Lise working on quitting smoking.    Let me know if you'd like to pursue further treatments for your thigh pain (other injections, ablative therapies, etc.).      Watch for any signs/symptoms of excess serotonin (rapid heartbeat, sweats, fevers, agitation, inability to relax, etc.).      Keep an eye on your blood pressures.    Check fasting labs around the new year.    Contact us or return if questions or concerns.     Please Follow Up when indicated on the section below this one on your After-Visit Summary.      If you had imaging scheduled please refer to your radiology prep sheet.    Appointment    Date_______________     Time_____________    Day:   M TU W TH F    With____________________________    Location_________________________    If you need medication refills, please contact your pharmacy 3 days before your prescriptions runs out. If you are out of refills, your pharmacy will contact contact the clinic.    Contact us or return if questions or concerns.     -Your Care Team:  MD Marlys Jeffrey PA-C Joel De Haan, PA-C Elizabeth McLean, APRN CNP    General information about your clinic      Clinic hours:     Lab hours:  Phone [phone]  Monday 7:30 am-7 pm    Monday 8:30 am-6:30 pm  Tuesday-Friday 7:30 am-5 pm   Tuesday-Friday 8:30  am-4:30 pm    Pharmacy hours:  Phone [phone]  Monday 8:30 am-7pm  Tuesday-Friday 8:30am-6 pm                                       Mychart assistance [phone]        We would like to hear from you, how was your visit today?    Karla Dobbs  Patient Information Supervisor   Patient Care Supervisor  [facility] [place] [facility] [place], [facility]  [phone]         Benton Browne MD, MD  [facility]

## 2018-09-11 DIAGNOSIS — G47.01 INSOMNIA DUE TO MEDICAL CONDITION: ICD-10-CM

## 2018-09-11 RX ORDER — TRAZODONE HYDROCHLORIDE 100 MG/1
TABLET ORAL
Qty: 90 TABLET | Refills: 0 | Status: CANCELLED | OUTPATIENT
Start: 2018-09-11

## 2018-09-12 DIAGNOSIS — G47.01 INSOMNIA DUE TO MEDICAL CONDITION: ICD-10-CM

## 2018-09-12 RX ORDER — TRAZODONE HYDROCHLORIDE 100 MG/1
TABLET ORAL
Qty: 90 TABLET | Refills: 0 | OUTPATIENT
Start: 2018-09-12

## 2018-09-12 NOTE — TELEPHONE ENCOUNTER
Trazodone    Next 5 appointments (look out 90 days)     Sep 14, 2018 11:00 AM CDT   [facility] Visit with Benton [facility]ne MD   [facility]    [address] [address] [phone]                Medication pended in that encounter.    Ivania Frances RN, BSN

## 2018-09-12 NOTE — TELEPHONE ENCOUNTER
Trazodone    Sent 8/13/2018 with #90. Refill not appropriate at this time.     Next 5 appointments (look out 90 days)     Sep 14, 2018 11:00 AM CDT   [facility]    [address] [address] [phone]                    Ivania Frances RN, BSN

## 2018-09-14 ENCOUNTER — OFFICE VISIT (OUTPATIENT)
Dept: FAMILY MEDICINE | Facility: OTHER | Age: 68
End: 2018-09-14
Payer: COMMERCIAL

## 2018-09-14 VITALS
SYSTOLIC BLOOD PRESSURE: 138 MMHG | TEMPERATURE: 97.5 F | DIASTOLIC BLOOD PRESSURE: 86 MMHG | WEIGHT: 178.1 LBS | BODY MASS INDEX: 25.55 KG/M2 | HEART RATE: 88 BPM | RESPIRATION RATE: 16 BRPM

## 2018-09-14 DIAGNOSIS — K21.9 GASTROESOPHAGEAL REFLUX DISEASE WITHOUT ESOPHAGITIS: ICD-10-CM

## 2018-09-14 DIAGNOSIS — N40.1 BPH WITH URINARY OBSTRUCTION: ICD-10-CM

## 2018-09-14 DIAGNOSIS — F32.5 MAJOR DEPRESSION IN COMPLETE REMISSION (H): ICD-10-CM

## 2018-09-14 DIAGNOSIS — G47.01 INSOMNIA DUE TO MEDICAL CONDITION: ICD-10-CM

## 2018-09-14 DIAGNOSIS — M79.604 CHRONIC PAIN OF RIGHT LOWER EXTREMITY: ICD-10-CM

## 2018-09-14 DIAGNOSIS — E78.5 HYPERLIPIDEMIA LDL GOAL <100: ICD-10-CM

## 2018-09-14 DIAGNOSIS — F41.0 PANIC ATTACK: ICD-10-CM

## 2018-09-14 DIAGNOSIS — G89.29 CHRONIC RIGHT-SIDED LOW BACK PAIN WITH RIGHT-SIDED SCIATICA: ICD-10-CM

## 2018-09-14 DIAGNOSIS — M79.2 NERVE PAIN: ICD-10-CM

## 2018-09-14 DIAGNOSIS — I10 HYPERTENSION GOAL BP (BLOOD PRESSURE) < 140/90: ICD-10-CM

## 2018-09-14 DIAGNOSIS — Z23 NEED FOR VACCINATION: ICD-10-CM

## 2018-09-14 DIAGNOSIS — G57.11 MERALGIA PARESTHETICA OF RIGHT SIDE: Primary | ICD-10-CM

## 2018-09-14 DIAGNOSIS — M54.41 CHRONIC RIGHT-SIDED LOW BACK PAIN WITH RIGHT-SIDED SCIATICA: ICD-10-CM

## 2018-09-14 DIAGNOSIS — F17.200 TOBACCO USE DISORDER: ICD-10-CM

## 2018-09-14 DIAGNOSIS — N13.8 BPH WITH URINARY OBSTRUCTION: ICD-10-CM

## 2018-09-14 DIAGNOSIS — G89.29 CHRONIC PAIN OF RIGHT LOWER EXTREMITY: ICD-10-CM

## 2018-09-14 PROCEDURE — 90471 IMMUNIZATION ADMIN: CPT | Performed by: FAMILY MEDICINE

## 2018-09-14 PROCEDURE — 99214 OFFICE O/P EST MOD 30 MIN: CPT | Mod: 25 | Performed by: FAMILY MEDICINE

## 2018-09-14 PROCEDURE — 90732 PPSV23 VACC 2 YRS+ SUBQ/IM: CPT | Performed by: FAMILY MEDICINE

## 2018-09-14 RX ORDER — ATORVASTATIN CALCIUM 80 MG/1
TABLET, FILM COATED ORAL
Qty: 90 TABLET | Refills: 0 | Status: SHIPPED | OUTPATIENT
Start: 2018-09-14 | End: 2018-12-28

## 2018-09-14 RX ORDER — DULOXETIN HYDROCHLORIDE 60 MG/1
60 CAPSULE, DELAYED RELEASE ORAL DAILY
Qty: 90 CAPSULE | Refills: 1 | Status: SHIPPED | OUTPATIENT
Start: 2018-09-14 | End: 2019-04-10

## 2018-09-14 RX ORDER — PANTOPRAZOLE SODIUM 40 MG/1
40 TABLET, DELAYED RELEASE ORAL DAILY
Qty: 90 TABLET | Refills: 3 | Status: SHIPPED | OUTPATIENT
Start: 2018-09-14

## 2018-09-14 RX ORDER — CYCLOBENZAPRINE HCL 10 MG
10 TABLET ORAL 3 TIMES DAILY
Qty: 270 TABLET | Refills: 0 | Status: SHIPPED | OUTPATIENT
Start: 2018-09-14 | End: 2019-02-07

## 2018-09-14 RX ORDER — TRAZODONE HYDROCHLORIDE 100 MG/1
TABLET ORAL
Qty: 90 TABLET | Refills: 0 | Status: SHIPPED | OUTPATIENT
Start: 2018-09-14 | End: 2018-10-10

## 2018-09-14 RX ORDER — HYDROXYZINE HYDROCHLORIDE 25 MG/1
25-50 TABLET, FILM COATED ORAL EVERY 6 HOURS PRN
Qty: 180 TABLET | Refills: 1 | Status: SHIPPED | OUTPATIENT
Start: 2018-09-14 | End: 2018-11-03

## 2018-09-14 RX ORDER — VARENICLINE TARTRATE 1 MG/1
1 TABLET, FILM COATED ORAL 2 TIMES DAILY
Qty: 180 TABLET | Refills: 0 | Status: SHIPPED | OUTPATIENT
Start: 2018-09-14 | End: 2019-04-09

## 2018-09-14 RX ORDER — DUTASTERIDE 0.5 MG/1
CAPSULE, LIQUID FILLED ORAL
Qty: 90 CAPSULE | Refills: 3 | Status: SHIPPED | OUTPATIENT
Start: 2018-09-14

## 2018-09-14 RX ORDER — LISINOPRIL 10 MG/1
10 TABLET ORAL DAILY
Qty: 90 TABLET | Refills: 1 | Status: SHIPPED | OUTPATIENT
Start: 2018-09-14 | End: 2019-03-08

## 2018-09-14 ASSESSMENT — ANXIETY QUESTIONNAIRES
7. FEELING AFRAID AS IF SOMETHING AWFUL MIGHT HAPPEN: SEVERAL DAYS
1. FEELING NERVOUS, ANXIOUS, OR ON EDGE: SEVERAL DAYS
5. BEING SO RESTLESS THAT IT IS HARD TO SIT STILL: NOT AT ALL
3. WORRYING TOO MUCH ABOUT DIFFERENT THINGS: SEVERAL DAYS
7. FEELING AFRAID AS IF SOMETHING AWFUL MIGHT HAPPEN: SEVERAL DAYS
GAD7 TOTAL SCORE: 5
GAD7 TOTAL SCORE: 5
2. NOT BEING ABLE TO STOP OR CONTROL WORRYING: SEVERAL DAYS
6. BECOMING EASILY ANNOYED OR IRRITABLE: NOT AT ALL
4. TROUBLE RELAXING: SEVERAL DAYS
GAD7 TOTAL SCORE: 5

## 2018-09-14 ASSESSMENT — PAIN SCALES - GENERAL: PAINLEVEL: MODERATE PAIN (4)

## 2018-09-14 ASSESSMENT — PATIENT HEALTH QUESTIONNAIRE - PHQ9
SUM OF ALL RESPONSES TO PHQ QUESTIONS 1-9: 6
10. IF YOU CHECKED OFF ANY PROBLEMS, HOW DIFFICULT HAVE THESE PROBLEMS MADE IT FOR YOU TO DO YOUR WORK, TAKE CARE OF THINGS AT HOME, OR GET ALONG WITH OTHER PEOPLE: SOMEWHAT DIFFICULT
SUM OF ALL RESPONSES TO PHQ QUESTIONS 1-9: 6

## 2018-09-14 NOTE — MR AVS SNAPSHOT
After Visit Summary   9/14/2018    Jeffy Rogers    MRN: [id number]           Patient Information     Date Of Birth          1950        Visit Information        Provider Department      9/14/2018 11:00 AM Benton Browne MD [facility]        Today's Diagnoses     Meralgia paresthetica of right side    -  1    Major depression in complete remission (H)        Hypertension goal BP (blood pressure) < 140/90        Insomnia due to medical condition        Panic attack        Tobacco use disorder        Chronic right-sided low back pain with right-sided sciatica        Chronic pain of right lower extremity        Nerve pain        Hyperlipidemia LDL goal <100        Gastroesophageal reflux disease without esophagitis        BPH with urinary obstruction          Care Instructions    Thank you for visiting [facility]    Keep working on quitting smoking.    Let me know if you'd like to pursue further treatments for your thigh pain (other injections, ablative therapies, etc.).      Watch for any signs/symptoms of excess serotonin (rapid heartbeat, sweats, fevers, agitation, inability to relax, etc.).      Keep an eye on your blood pressures.    Check fasting labs around the new year.    Contact us or return if questions or concerns.     Please Follow Up when indicated on the section below this one on your After-Visit Summary.      If you had imaging scheduled please refer to your radiology prep sheet.    Appointment    Date_______________     Time_____________    Day:   M TU W TH F    With____________________________    Location_________________________    If you need medication refills, please contact your pharmacy 3 days before your prescriptions runs out. If you are out of refills, your pharmacy will contact contact the clinic.    Contact us or return if questions or concerns.     -Your Care Team:  MD Marlys Jeffrey PA-C Joel De Haan  YUE De La Paz CNP    General information about your clinic      Clinic hours:     Lab hours:  Phone [phone]  Monday 7:30 am-7 pm    Monday 8:30 am-6:30 pm  Tuesday-Friday 7:30 am-5 pm   Tuesday-Friday 8:30 am-4:30 pm    Pharmacy hours:  Phone [phone]  Monday 8:30 am-7pm  Tuesday-Friday 8:30am-6 pm                                       Mychart assistance [phone]        We would like to hear from you, how was your visit today?    Karla Dobbs  Patient Information Supervisor   Patient Care Supervisor  [facility] [place], [facility] [place], and [facility]  [phone]             Follow-ups after your visit        Follow-up notes from your care team     Return in about 6 months (around 3/14/2019) for Recheck.      Future tests that were ordered for you today     Open Future Orders        Priority Expected Expires Ordered    Lipid panel reflex to direct LDL Fasting Routine  3/13/2019 9/14/2018    Comprehensive metabolic panel Routine  3/13/2019 9/14/2018            Who to contact     If you have questions or need follow up information about today's clinic visit or your schedule please contact [facility] directly at [phone].  Normal or non-critical lab and imaging results will be communicated to you by Sotmarkethart, letter or phone within 4 business days after the clinic has received the results. If you do not hear from us within 7 days, please contact the clinic through Sotmarkethart or phone. If you have a critical or abnormal lab result, we will notify you by phone as soon as possible.  Submit refill requests through ForMune or call your pharmacy and they will forward the refill request to us. Please allow 3 business days for your refill to be completed.          Additional Information About Your Visit        MyChart Information     ForMune gives you secure access to your electronic health record. If you see  a primary care provider, you can also send messages to your care team and make appointments. If you have questions, please call your primary care clinic.  If you do not have a primary care provider, please call [phone] and they will assist you.        Care EveryWhere ID     This is your Care EveryWhere ID. This could be used by other organizations to access your [place] medical records  [id number]        Your Vitals Were     Pulse Temperature Respirations BMI (Body Mass Index)          88 97.5  F (36.4  C) (Temporal) 16 25.55 kg/m2         Blood Pressure from Last 3 Encounters:   09/14/18 138/86   08/09/18 146/90   06/27/18 154/78    Weight from Last 3 Encounters:   09/14/18 178 lb 1.6 oz (80.8 kg)   08/09/18 173 lb 9.6 oz (78.7 kg)   06/27/18 177 lb 3.2 oz (80.4 kg)                 Today's Medication Changes          These changes are accurate as of 9/14/18 11:35 AM.  If you have any questions, ask your nurse or doctor.               These medicines have changed or have updated prescriptions.        Dose/Directions    traZODone 100 MG tablet   Commonly known as:  DESYREL   This may have changed:  See the new instructions.   Used for:  Insomnia due to medical condition   Changed by:  Benton Browne MD        TAKE 2 TABLETS BY MOUTH AT BEDTIME   Quantity:  90 tablet   Refills:  0            Where to get your medicines      These medications were sent to [id number] [address] [facility]  [address] [address] [address]     Phone:  [phone]     atorvastatin 80 MG tablet    cyclobenzaprine 10 MG tablet    DULoxetine 60 MG EC capsule    dutasteride 0.5 MG capsule    hydrOXYzine 25 MG tablet    lisinopril 10 MG tablet    pantoprazole 40 MG EC tablet    traZODone 100 MG tablet    varenicline 1 MG tablet                Primary Care Provider Office Phone # Fax #    Logan Dewey [phone] [address]  [address] [place] [address]        Equal Access to Services     [facility] Center[facility]OHNATHAN : Hadii aad ku hadgabrielachirag Soemelyali, waaxda luqadaha, qaybta kaalmajudith srinivasan, jeffrey leal. So [facility] [phone].    ATENCIÓN: Si habla español, tiene a redman disposición servicios gratuitos de asistencia lingüística. [facility] [phone].    We comply with applicable federal civil rights laws and [place] laws. We do not discriminate on the basis of race, color, national origin, age, disability, sex, sexual orientation, or gender identity.            Thank you!     Thank you for choosing [facility]  for your care. Our goal is always to provide you with excellent care. Hearing back from our patients is one way we can continue to improve our services. Please take a few minutes to complete the written survey that you may receive in the mail after your visit with us. Thank you!             Your Updated Medication List - Protect others around you: Learn how to safely use, store and throw away your medicines at www.disposemymeds.org.          This list is accurate as of 9/14/18 11:35 AM.  Always use your most recent med list.                   Brand Name Dispense Instructions for use Diagnosis    ascorbic acid 1000 MG Tabs tablet     30    1 TABLET DAILY AT DINNER        aspirin 81 MG tablet     100    1 TABLET DAILY    Essential hypertension, benign       atorvastatin 80 MG tablet    LIPITOR    90 tablet    TAKE 1 TABLET (80 MG) BY MOUTH DAILY    Hyperlipidemia LDL goal <100       calcium carbonate-Vit D-Min 9804-9544 MG-UNIT Chew      1 TABLET THREE TIMES DAILY        cyclobenzaprine 10 MG tablet    FLEXERIL    270 tablet    Take 1 tablet (10 mg) by mouth 3 times daily    Chronic right-sided low back pain with right-sided sciatica, Chronic pain of right lower extremity, Nerve pain       DULoxetine 60 MG EC capsule    CYMBALTA    90 capsule    Take 1 capsule (60 mg) by mouth daily    Meralgia paresthetica  of right side, Panic attack       dutasteride 0.5 MG capsule    AVODART    90 capsule    TAKE 1 CAPSULE (0.5 MG) BY MOUTH DAILY    BPH with urinary obstruction       hydrOXYzine 25 MG tablet    ATARAX    180 tablet    Take 1-2 tablets (25-50 mg) by mouth every 6 hours as needed for anxiety    Panic attack       lidocaine 5 % Patch    LIDODERM    90 patch    Apply up to 3 patches to painful area at once for up to 12 h within a 24 h period.  Remove after 12 hours.    Chronic pain of right lower extremity       lisinopril 10 MG tablet    PRINIVIL/ZESTRIL    90 tablet    Take 1 tablet (10 mg) by mouth daily    Hypertension goal BP (blood pressure) < 140/90       metoprolol succinate 50 MG 24 hr tablet    TOPROL-XL    90 tablet    Take 1 tablet (50 mg) by mouth daily    Hypertension goal BP (blood pressure) < 140/90       Multi-vitamin Tabs tablet   Generic drug:  multivitamin, therapeutic with minerals     0    1 TABLET DAILY        pantoprazole 40 MG EC tablet    PROTONIX    90 tablet    Take 1 tablet (40 mg) by mouth daily    Gastroesophageal reflux disease without esophagitis       sertraline 100 MG tablet    ZOLOFT    180 tablet    TAKE 2 TABLETS (200 MG) BY MOUTH DAILY        traZODone 100 MG tablet    DESYREL    90 tablet    TAKE 2 TABLETS BY MOUTH AT BEDTIME    Insomnia due to medical condition       varenicline 1 MG tablet    CHANTIX    180 tablet    Take 1 tablet (1 mg) by mouth 2 times daily    Tobacco use disorder       vitamin D 1000 units capsule     0    1 CAPSULE DAILY

## 2018-09-14 NOTE — PROGRESS NOTES
Screening Questionnaire for Adult Immunization    Are you sick today?   No   Do you have allergies to medications, food, a vaccine component or latex?   No   Have you ever had a serious reaction after receiving a vaccination?   No   Do you have a long-term health problem with heart disease, lung disease, asthma, kidney disease, metabolic disease (e.g. diabetes), anemia, or other blood disorder?   No   Do you have cancer, leukemia, HIV/AIDS, or any other immune system problem?   No   In the past 3 months, have you taken medications that affect  your immune system, such as prednisone, other steroids, or anticancer drugs; drugs for the treatment of rheumatoid arthritis, Crohn s disease, or psoriasis; or have you had radiation treatments?   No   Have you had a seizure, or a brain or other nervous system problem?   No   During the past year, have you received a transfusion of blood or blood     products, or been given immune (gamma) globulin or antiviral drug?   No   For women: Are you pregnant or is there a chance you could become        pregnant during the next month?   No   Have you received any vaccinations in the past 4 weeks?   Flu shot, huddled with [facility]. He said give in opposite arm     Immunization questionnaire was positive for at least one answer.  Notified [facility].      Prior to injection verified patient identity using patient's name and date of birth.  Due to injection administration, patient instructed to remain in clinic for 15 minutes  afterwards, and to report any adverse reaction to me immediately.    Screening performed by Alayna Nuno on 9/14/2018 at 11:45 AM.

## 2018-09-14 NOTE — PATIENT INSTRUCTIONS
Thank you for visiting [facility]    Keep working on quitting smoking.    Let me know if you'd like to pursue further treatments for your thigh pain (other injections, ablative therapies, etc.).      Watch for any signs/symptoms of excess serotonin (rapid heartbeat, sweats, fevers, agitation, inability to relax, etc.).      Keep an eye on your blood pressures.    Check fasting labs around the new year.    Contact us or return if questions or concerns.     Please Follow Up when indicated on the section below this one on your After-Visit Summary.      If you had imaging scheduled please refer to your radiology prep sheet.    Appointment    Date_______________     Time_____________    Day:   M TU W TH F    With____________________________    Location_________________________    If you need medication refills, please contact your pharmacy 3 days before your prescriptions runs out. If you are out of refills, your pharmacy will contact contact the clinic.    Contact us or return if questions or concerns.     -Your Care Team:  MD Marlys Jeffrey PA-C Joel De Haan, PA-C Elizabeth McLean, APRN CNP    General information about your clinic      Clinic hours:     Lab hours:  Phone [phone]  Monday 7:30 am-7 pm    Monday 8:30 am-6:30 pm  Tuesday-Friday 7:30 am-5 pm   Tuesday-Friday 8:30 am-4:30 pm    Pharmacy hours:  Phone [phone]  Monday 8:30 am-7pm  Tuesday-Friday 8:30am-6 pm                                       Mychart assistance [phone]        We would like to hear from you, how was your visit today?    Karla Dobbs  Patient Information Supervisor   Patient Care Supervisor  [facility], and [facility], and [facility]  [phone]

## 2018-09-15 ASSESSMENT — PATIENT HEALTH QUESTIONNAIRE - PHQ9: SUM OF ALL RESPONSES TO PHQ QUESTIONS 1-9: 6

## 2018-09-15 ASSESSMENT — ANXIETY QUESTIONNAIRES: GAD7 TOTAL SCORE: 5

## 2018-09-28 ENCOUNTER — TELEPHONE (OUTPATIENT)
Dept: FAMILY MEDICINE | Facility: OTHER | Age: 68
End: 2018-09-28

## 2018-09-28 NOTE — TELEPHONE ENCOUNTER
LMTC, please inform of message below and assist with scheduling  Thanks  Ilda Bryant RT (R)

## 2018-09-28 NOTE — TELEPHONE ENCOUNTER
Reason for Call:  Other call back    Detailed comments: Patient called clinic. He is asking if Dr. Browne would be able to help him get a handicap sticker. Please give him a call to discuss what he would need to do.     Phone Number Patient can be reached at: Cell number on file:    Telephone Information:   Mobile [phone]       Best Time: any    Can we leave a detailed message on this number? YES    Call taken on 9/28/2018 at 1:55 PM by Mahamed Solomon

## 2018-10-01 ENCOUNTER — OFFICE VISIT (OUTPATIENT)
Dept: FAMILY MEDICINE | Facility: OTHER | Age: 68
End: 2018-10-01
Payer: COMMERCIAL

## 2018-10-01 VITALS
RESPIRATION RATE: 24 BRPM | HEART RATE: 80 BPM | SYSTOLIC BLOOD PRESSURE: 144 MMHG | BODY MASS INDEX: 26.4 KG/M2 | WEIGHT: 184 LBS | OXYGEN SATURATION: 98 % | TEMPERATURE: 98.3 F | DIASTOLIC BLOOD PRESSURE: 80 MMHG

## 2018-10-01 DIAGNOSIS — G89.29 CHRONIC BILATERAL LOW BACK PAIN WITH RIGHT-SIDED SCIATICA: Primary | ICD-10-CM

## 2018-10-01 DIAGNOSIS — M54.41 CHRONIC BILATERAL LOW BACK PAIN WITH RIGHT-SIDED SCIATICA: Primary | ICD-10-CM

## 2018-10-01 DIAGNOSIS — M79.604 CHRONIC PAIN OF RIGHT LOWER EXTREMITY: ICD-10-CM

## 2018-10-01 DIAGNOSIS — G89.29 CHRONIC PAIN OF RIGHT LOWER EXTREMITY: ICD-10-CM

## 2018-10-01 DIAGNOSIS — G57.11 MERALGIA PARESTHETICA OF RIGHT SIDE: ICD-10-CM

## 2018-10-01 PROCEDURE — 99212 OFFICE O/P EST SF 10 MIN: CPT | Performed by: PHYSICIAN ASSISTANT

## 2018-10-01 ASSESSMENT — PAIN SCALES - GENERAL: PAINLEVEL: SEVERE PAIN (7)

## 2018-10-01 NOTE — PROGRESS NOTES
SUBJECTIVE:   Jeffy Rogers is a 67 year old male who presents to clinic today for the following health issues:      HPI  Patient presents today for a handicap parking sticker.  Has been struggling with right-sided pain with walking more than 30-40 feet.  He consistently uses electric shopping carts in any type of store that he goes into.  He presents to the clinic today using a cane with an antalgic slow gait through the clinic.      Problem list and histories reviewed & adjusted, as indicated.  Additional history: as documented      Patient Active Problem List   Diagnosis     Cardiovascular disease     Hypothyroidism, unspecified type     Congenital anomaly of lung     Left shoulder pain     Primary insomnia     MRSA (methicillin resistant staph aureus) culture positive     Hyperlipidemia LDL goal <100     Merlos esophagus     Tobacco use disorder     CKD (chronic kidney disease) stage 3, GFR 30-59 ml/min     Hypertension goal BP (blood pressure) < 140/90     Advanced directives, counseling/discussion     Major depression in complete remission (H)     Impaired fasting glucose     Panic attacks     Chronic lower back pain     SK (seborrheic keratosis)     Insomnia due to medical condition     Abnormal thyroid stimulating hormone level     Meralgia paresthetica of right side     Chronic pain of right lower extremity     BPH with urinary obstruction     Marijuana abuse     Past Surgical History:   Procedure Laterality Date     ABDOMEN SURGERY       ANGIOGRAM       aortic iliac renal bypass  2000     BACK SURGERY       C APPENDECTOMY  1969     C NONSPECIFIC PROCEDURE  08/1999    left common iliac angioplasty and stent placement     C NONSPECIFIC PROCEDURE  11/2000    aortal femoral bypass and repair of renal artery stenosis     C VEIN IN SITU BYPASS GRAFT,FEM-POP  04/07/2003    Right femoral to above knee popliteal area bypass with 8 mm Dacron graft. Aortogram with runoff.     ESOPHAGOSCOPY, GASTROSCOPY,  DUODENOSCOPY (EGD), COMBINED  11/23/2010    COMBINED ESOPHAGOSCOPY, GASTROSCOPY, DUODENOSCOPY (EGD), BIOPSY SINGLE OR MULTIPLE performed by MADDY SANCHEZ at  GI     ESOPHAGOSCOPY, GASTROSCOPY, DUODENOSCOPY (EGD), COMBINED N/A 12/18/2015    Procedure: COMBINED ESOPHAGOSCOPY, GASTROSCOPY, DUODENOSCOPY (EGD), BIOPSY SINGLE OR MULTIPLE;  Surgeon: Benton Gutiérrez MD;  Location:  GI     HC COLONOSCOPY W BIOPSY  03/10/08     HC REMOVE TONSILS/ADENOIDS,<13 Y/O      unsure of age     HC UGI ENDOSCOPY, SIMPLE EXAM  10/13/09     HERNIA REPAIR, INGUINAL RT/LT  03/30/09    Right     RENAL ARTERY ANGIOGRAM       VASCULAR SURGERY         Social History   Substance Use Topics     Smoking status: Current Every Day Smoker     Packs/day: 1.00     Years: 32.00     Types: Cigarettes     Smokeless tobacco: Never Used      Comment:  E-cigg, occasional cigs daily, hasnt smoked for a few weeks     Alcohol use Yes      Comment: occ-hardly ever 2-3 times a year     Family History   Problem Relation Age of Onset     Alcohol/Drug Mother      Depression Mother      Diabetes Maternal Grandfather      HEART DISEASE Maternal Grandfather      Diabetes Maternal Grandmother      Alcohol/Drug Brother      Alcohol/Drug Sister          Current Outpatient Prescriptions   Medication Sig Dispense Refill     ASCORBIC ACID 1000 MG PO TABS 1 TABLET DAILY AT DINNER 30 0     ASPIRIN 81 MG OR TABS 1 TABLET DAILY 100 0     atorvastatin (LIPITOR) 80 MG tablet TAKE 1 TABLET (80 MG) BY MOUTH DAILY 90 tablet 0     CALCIUM CARBONATE-VIT D-MIN 1452-6984 MG-UNIT PO CHEW 1 TABLET THREE TIMES DAILY       cyclobenzaprine (FLEXERIL) 10 MG tablet Take 1 tablet (10 mg) by mouth 3 times daily 270 tablet 0     DULoxetine (CYMBALTA) 60 MG EC capsule Take 1 capsule (60 mg) by mouth daily 90 capsule 1     dutasteride (AVODART) 0.5 MG capsule TAKE 1 CAPSULE (0.5 MG) BY MOUTH DAILY 90 capsule 3     hydrOXYzine (ATARAX) 25 MG tablet Take 1-2 tablets (25-50 mg) by mouth  every 6 hours as needed for anxiety 180 tablet 1     lidocaine (LIDODERM) 5 % Patch Apply up to 3 patches to painful area at once for up to 12 h within a 24 h period.  Remove after 12 hours. 90 patch 3     lisinopril (PRINIVIL/ZESTRIL) 10 MG tablet Take 1 tablet (10 mg) by mouth daily 90 tablet 1     metoprolol (TOPROL-XL) 50 MG 24 hr tablet Take 1 tablet (50 mg) by mouth daily 90 tablet 2     MULTI-VITAMIN OR TABS 1 TABLET DAILY  0 0     pantoprazole (PROTONIX) 40 MG EC tablet Take 1 tablet (40 mg) by mouth daily 90 tablet 3     sertraline (ZOLOFT) 100 MG tablet TAKE 2 TABLETS (200 MG) BY MOUTH DAILY 180 tablet 3     traZODone (DESYREL) 100 MG tablet TAKE 2 TABLETS BY MOUTH AT BEDTIME 90 tablet 0     varenicline (CHANTIX) 1 MG tablet Take 1 tablet (1 mg) by mouth 2 times daily 180 tablet 0     VITAMIN D 1000 UNIT OR CAPS 1 CAPSULE DAILY 0 0     Allergies   Allergen Reactions     No Known Drug Allergies      Recent Labs   Lab Test  08/09/18   1441  06/27/18   1525  12/28/17   1018  06/02/17   1615  12/05/16   1601   11/20/15   1045   04/21/11   1241   LDL   --    --   75   --   76   --   51   < >  107   HDL   --    --   39*   --   42   --   40   < >  49   TRIG   --    --   90   --   147   --   111   < >  134   ALT  23   --    --   20   --    --    --    --   16   CR  1.10  1.61*   --   1.17  1.09   < >  0.96   < >   --    GFRESTIMATED  67  43*   --   62  68   < >  79   < >   --    GFRESTBLACK  81  52*   --   76  82   < >  >90   GFR Calc     < >   --    POTASSIUM  4.2  3.8   --   5.1  4.1   < >  4.4   < >   --    TSH  0.61  0.62   --   0.51  0.08*   --   0.50   < >   --     < > = values in this interval not displayed.      BP Readings from Last 3 Encounters:   10/01/18 144/80   09/14/18 138/86   08/09/18 146/90    Wt Readings from Last 3 Encounters:   10/01/18 184 lb (83.5 kg)   09/14/18 178 lb 1.6 oz (80.8 kg)   08/09/18 173 lb 9.6 oz (78.7 kg)                  Labs reviewed in  EPIC    ROS:  Constitutional, HEENT, cardiovascular, pulmonary, gi and gu systems are negative, except as otherwise noted.    OBJECTIVE:     /80 (Cuff Size: Adult Regular)  Pulse 80  Temp 98.3  F (36.8  C) (Temporal)  Resp 24  Wt 184 lb (83.5 kg)  SpO2 98%  BMI 26.4 kg/m2  Body mass index is 26.4 kg/(m^2).  GENERAL: healthy, alert and no distress  RESP: lungs clear to auscultation - no rales, rhonchi or wheezes  CV: regular rate and rhythm, normal S1 S2, no S3 or S4, no murmur, click or rub, no peripheral edema and peripheral pulses strong  MS: Extremities are within normal limits though he does rely on a cane to ambulate very slowly in the hallways.  Findings consistent with his overall diagnosis and reason back behind the need for a DOT sticker for handicap parking.  SKIN: no suspicious lesions or rashes to visible skin  NEURO: Normal strength and tone, mentation intact and speech normal  PSYCH: mentation appears normal, affect normal/bright      ASSESSMENT/PLAN:     1. Chronic bilateral low back pain with right-sided sciatica  2. Chronic pain of right lower extremity  3. Meralgia paresthetica of right side  I gave him a certificate for 1 year and advised that he needs to follow-up within that year for further evaluation.  He may indeed need a permanent certification in the future.    Kirill Johnson PA-C  [facility]

## 2018-10-01 NOTE — MR AVS SNAPSHOT
After Visit Summary   10/1/2018    Jeffy Rogers    MRN: [id number]           Patient Information     Date Of Birth          1950        Visit Information        Provider Department      10/1/2018 12:00 PM Kirill Weller PA-C [facility]        Today's Diagnoses     Chronic bilateral low back pain with right-sided sciatica    -  1    Chronic pain of right lower extremity        Meralgia paresthetica of right side           Follow-ups after your visit        Who to contact     If you have questions or need follow up information about today's clinic visit or your schedule please contact [facility] directly at [phone].  Normal or non-critical lab and imaging results will be communicated to you by MyChart, letter or phone within 4 business days after the clinic has received the results. If you do not hear from us within 7 days, please contact the clinic through [employer] or phone. If you have a critical or abnormal lab result, we will notify you by phone as soon as possible.  Submit refill requests through [employer] or call your pharmacy and they will forward the refill request to us. Please allow 3 business days for your refill to be completed.          Additional Information About Your Visit        MyChart Information     [employer] gives you secure access to your electronic health record. If you see a primary care provider, you can also send messages to your care team and make appointments. If you have questions, please call your primary care clinic.  If you do not have a primary care provider, please call [phone] and they will assist you.        Care EveryWhere ID     This is your Care EveryWhere ID. This could be used by other organizations to access your [facility] medical records  [id number]        Your Vitals Were     Pulse Temperature Respirations Pulse Oximetry BMI (Body Mass Index)       80 98.3  F (36.8  C) (Temporal) 24 98% 26.4 kg/m2        Blood  Pressure from Last 3 Encounters:   10/01/18 144/80   09/14/18 138/86   08/09/18 146/90    Weight from Last 3 Encounters:   10/01/18 184 lb (83.5 kg)   09/14/18 178 lb 1.6 oz (80.8 kg)   08/09/18 173 lb 9.6 oz (78.7 kg)              Today, you had the following     No orders found for display       Primary Care Provider Office Phone # Fax #    Logan Dewey [phone] [address] [place] [address]        Equal Access to Services     [facility]: Hadii david roberts hadasho Sotamara, waaxda luqadaha, qaybta kaalmada zarina, jeffrey carvajal . So [facility] [phone].    ATENCIÓN: Si habla español, tiene a redman disposición servicios gratuitos de asistencia lingüística. [facility] [phone].    We comply with applicable federal civil rights laws and [place] laws. We do not discriminate on the basis of race, color, national origin, age, disability, sex, sexual orientation, or gender identity.            Thank you!     Thank you for choosing [facility]  for your care. Our goal is always to provide you with excellent care. Hearing back from our patients is one way we can continue to improve our services. Please take a few minutes to complete the written survey that you may receive in the mail after your visit with us. Thank you!             Your Updated Medication List - Protect others around you: Learn how to safely use, store and throw away your medicines at www.disposemymeds.org.          This list is accurate as of 10/1/18 12:29 PM.  Always use your most recent med list.                   Brand Name Dispense Instructions for use Diagnosis    ascorbic acid 1000 MG Tabs tablet     30    1 TABLET DAILY AT DINNER        aspirin 81 MG tablet     100    1 TABLET DAILY    Essential hypertension, benign       atorvastatin 80 MG tablet    LIPITOR    90 tablet    TAKE 1 TABLET (80 MG) BY MOUTH DAILY    Hyperlipidemia LDL goal <100       calcium  carbonate-Vit D-Min 5443-5284 MG-UNIT Chew      1 TABLET THREE TIMES DAILY        cyclobenzaprine 10 MG tablet    FLEXERIL    270 tablet    Take 1 tablet (10 mg) by mouth 3 times daily    Chronic right-sided low back pain with right-sided sciatica, Chronic pain of right lower extremity, Nerve pain       DULoxetine 60 MG EC capsule    CYMBALTA    90 capsule    Take 1 capsule (60 mg) by mouth daily    Meralgia paresthetica of right side, Panic attack       dutasteride 0.5 MG capsule    AVODART    90 capsule    TAKE 1 CAPSULE (0.5 MG) BY MOUTH DAILY    BPH with urinary obstruction       hydrOXYzine 25 MG tablet    ATARAX    180 tablet    Take 1-2 tablets (25-50 mg) by mouth every 6 hours as needed for anxiety    Panic attack       lidocaine 5 % Patch    LIDODERM    90 patch    Apply up to 3 patches to painful area at once for up to 12 h within a 24 h period.  Remove after 12 hours.    Chronic pain of right lower extremity       lisinopril 10 MG tablet    PRINIVIL/ZESTRIL    90 tablet    Take 1 tablet (10 mg) by mouth daily    Hypertension goal BP (blood pressure) < 140/90       metoprolol succinate 50 MG 24 hr tablet    TOPROL-XL    90 tablet    Take 1 tablet (50 mg) by mouth daily    Hypertension goal BP (blood pressure) < 140/90       Multi-vitamin Tabs tablet   Generic drug:  multivitamin, therapeutic with minerals     0    1 TABLET DAILY        pantoprazole 40 MG EC tablet    PROTONIX    90 tablet    Take 1 tablet (40 mg) by mouth daily    Gastroesophageal reflux disease without esophagitis       sertraline 100 MG tablet    ZOLOFT    180 tablet    TAKE 2 TABLETS (200 MG) BY MOUTH DAILY        traZODone 100 MG tablet    DESYREL    90 tablet    TAKE 2 TABLETS BY MOUTH AT BEDTIME    Insomnia due to medical condition       varenicline 1 MG tablet    CHANTIX    180 tablet    Take 1 tablet (1 mg) by mouth 2 times daily    Tobacco use disorder       vitamin D 1000 units capsule     0    1 CAPSULE DAILY

## 2018-10-10 DIAGNOSIS — G47.01 INSOMNIA DUE TO MEDICAL CONDITION: ICD-10-CM

## 2018-10-12 RX ORDER — TRAZODONE HYDROCHLORIDE 100 MG/1
TABLET ORAL
Qty: 180 TABLET | Refills: 3 | Status: SHIPPED | OUTPATIENT
Start: 2018-10-12

## 2018-10-12 NOTE — TELEPHONE ENCOUNTER
Trazodone:  Prescription approved per [facility] [place].    Sofia Newsome, RN, BSN

## 2018-10-15 DIAGNOSIS — G47.01 INSOMNIA DUE TO MEDICAL CONDITION: ICD-10-CM

## 2018-10-15 RX ORDER — TRAZODONE HYDROCHLORIDE 100 MG/1
TABLET ORAL
Qty: 90 TABLET | Refills: 0 | OUTPATIENT
Start: 2018-10-15

## 2018-10-15 NOTE — TELEPHONE ENCOUNTER
Trazodone:  Sent 10/12/18 with 1 year supply. Refill not appropriate at this time.     Sofia Newsome, RN, BSN

## 2018-10-29 ENCOUNTER — TELEPHONE (OUTPATIENT)
Dept: FAMILY MEDICINE | Facility: CLINIC | Age: 68
End: 2018-10-29

## 2018-10-29 NOTE — TELEPHONE ENCOUNTER
Summary:    Patient is due/failing the following:   PHQ9    Action needed:   Patient needs to do PHQ9.    Type of outreach:    Sent HomeCon message.    Questions for provider review:    None                                                                                                                                    Erika Irwin       Chart routed to Care Team .        Panel Management Review      Patient has the following on his problem list:     Depression / Dysthymia review    Measure:  Needs PHQ-9 score of 4 or less during index window.  Administer PHQ-9 and if score is 5 or more, send encounter to provider for next steps.        PHQ-9 SCORE 6/27/2018 8/9/2018 9/14/2018   Total Score - - -   Total Score MyChart 13 (Moderate depression) 12 (Moderate depression) 6 (Mild depression)   Total Score 13 12 6       If PHQ-9 recheck is 5 or more, route to provider for next steps.    Patient is due for:  PHQ9    Hypertension   Last three blood pressure readings:  BP Readings from Last 3 Encounters:   10/01/18 144/80   09/14/18 138/86   08/09/18 146/90     Blood pressure: FAILED    HTN Guidelines:  Age 18-59 BP range:  Less than 140/90  Age 60-85 with Diabetes:  Less than 140/90  Age 60-85 without Diabetes:  less than 150/90      Composite cancer screening  Chart review shows that this patient is due/due soon for the following None

## 2018-11-03 DIAGNOSIS — F41.0 PANIC ATTACK: ICD-10-CM

## 2018-11-05 RX ORDER — HYDROXYZINE HYDROCHLORIDE 25 MG/1
TABLET, FILM COATED ORAL
Qty: 180 TABLET | Refills: 2 | Status: SHIPPED | OUTPATIENT
Start: 2018-11-05 | End: 2019-01-11

## 2018-11-05 NOTE — TELEPHONE ENCOUNTER
Hydroxyzine:  Prescription approved per [facility] [place] [place] Refill Protocol.    Sofia Newsome, RN, BSN

## 2018-12-28 ENCOUNTER — TELEPHONE (OUTPATIENT)
Dept: FAMILY MEDICINE | Facility: OTHER | Age: 68
End: 2018-12-28

## 2018-12-28 DIAGNOSIS — E78.5 HYPERLIPIDEMIA LDL GOAL <100: ICD-10-CM

## 2018-12-31 RX ORDER — ATORVASTATIN CALCIUM 80 MG/1
TABLET, FILM COATED ORAL
Qty: 30 TABLET | Refills: 0 | Status: SHIPPED | OUTPATIENT
Start: 2018-12-31 | End: 2019-04-09

## 2018-12-31 NOTE — TELEPHONE ENCOUNTER
Atorvastatin    Medication is being filled for 1 time refill only due to:  Future labs ordered Fasting.     Please schedule Lab appt for fasting labs    Ivania Frances RN, BSN

## 2019-01-02 NOTE — TELEPHONE ENCOUNTER
Left message for patient to return call to clinic. When call is returned please inform patient he is due for fasting labs. I see he is coming in tomorrow at 11 am to see Lise Thomason, is he able to come 15 mins early to fasting labs?    Ko Milan,

## 2019-01-02 NOTE — TELEPHONE ENCOUNTER
Reason for Call:  Other returning call    Detailed comments: Patient called clinic back. Added him to schedule tomorrow morning at 10:45    Phone Number Patient can be reached at: Home number on file [phone] (home)    Best Time: ---    Can we leave a detailed message on this number? Not Applicable    Call taken on 1/2/2019 at 10:57 AM by Mahamed Solomon

## 2019-01-03 ENCOUNTER — OFFICE VISIT (OUTPATIENT)
Dept: FAMILY MEDICINE | Facility: OTHER | Age: 69
End: 2019-01-03
Payer: COMMERCIAL

## 2019-01-03 VITALS
HEIGHT: 71 IN | SYSTOLIC BLOOD PRESSURE: 114 MMHG | OXYGEN SATURATION: 97 % | WEIGHT: 180 LBS | RESPIRATION RATE: 16 BRPM | TEMPERATURE: 97.8 F | DIASTOLIC BLOOD PRESSURE: 66 MMHG | BODY MASS INDEX: 25.2 KG/M2 | HEART RATE: 82 BPM

## 2019-01-03 DIAGNOSIS — E78.5 HYPERLIPIDEMIA LDL GOAL <100: ICD-10-CM

## 2019-01-03 DIAGNOSIS — L72.3 INFECTED SEBACEOUS CYST: Primary | ICD-10-CM

## 2019-01-03 DIAGNOSIS — L08.9 INFECTED SEBACEOUS CYST: Primary | ICD-10-CM

## 2019-01-03 DIAGNOSIS — E78.5 HYPERLIPEMIA: Primary | ICD-10-CM

## 2019-01-03 LAB
ALBUMIN SERPL-MCNC: 3.7 G/DL (ref 3.4–5)
ALP SERPL-CCNC: 129 U/L (ref 40–150)
ALT SERPL W P-5'-P-CCNC: 24 U/L (ref 0–70)
ANION GAP SERPL CALCULATED.3IONS-SCNC: 6 MMOL/L (ref 3–14)
AST SERPL W P-5'-P-CCNC: 14 U/L (ref 0–45)
BILIRUB SERPL-MCNC: 0.3 MG/DL (ref 0.2–1.3)
BUN SERPL-MCNC: 18 MG/DL (ref 7–30)
CALCIUM SERPL-MCNC: 9.1 MG/DL (ref 8.5–10.1)
CHLORIDE SERPL-SCNC: 107 MMOL/L (ref 94–109)
CHOLEST SERPL-MCNC: 187 MG/DL
CO2 SERPL-SCNC: 26 MMOL/L (ref 20–32)
CREAT SERPL-MCNC: 1.21 MG/DL (ref 0.66–1.25)
GFR SERPL CREATININE-BSD FRML MDRD: 62 ML/MIN/{1.73_M2}
GLUCOSE SERPL-MCNC: 100 MG/DL (ref 70–99)
HDLC SERPL-MCNC: 39 MG/DL
LDLC SERPL CALC-MCNC: 116 MG/DL
NONHDLC SERPL-MCNC: 148 MG/DL
POTASSIUM SERPL-SCNC: 4.4 MMOL/L (ref 3.4–5.3)
PROT SERPL-MCNC: 7.8 G/DL (ref 6.8–8.8)
SODIUM SERPL-SCNC: 139 MMOL/L (ref 133–144)
TRIGL SERPL-MCNC: 158 MG/DL

## 2019-01-03 PROCEDURE — 99213 OFFICE O/P EST LOW 20 MIN: CPT | Mod: 25 | Performed by: PHYSICIAN ASSISTANT

## 2019-01-03 PROCEDURE — 36415 COLL VENOUS BLD VENIPUNCTURE: CPT | Performed by: FAMILY MEDICINE

## 2019-01-03 PROCEDURE — 80053 COMPREHEN METABOLIC PANEL: CPT | Performed by: FAMILY MEDICINE

## 2019-01-03 PROCEDURE — 80061 LIPID PANEL: CPT | Performed by: FAMILY MEDICINE

## 2019-01-03 PROCEDURE — 10060 I&D ABSCESS SIMPLE/SINGLE: CPT | Performed by: PHYSICIAN ASSISTANT

## 2019-01-03 RX ORDER — CEPHALEXIN 500 MG/1
500 CAPSULE ORAL 3 TIMES DAILY
Qty: 21 CAPSULE | Refills: 0 | Status: SHIPPED | OUTPATIENT
Start: 2019-01-03 | End: 2019-04-09

## 2019-01-03 SDOH — HEALTH STABILITY: MENTAL HEALTH: HOW OFTEN DO YOU HAVE A DRINK CONTAINING ALCOHOL?: NEVER

## 2019-01-03 ASSESSMENT — MIFFLIN-ST. JEOR: SCORE: 1613.6

## 2019-01-03 ASSESSMENT — PAIN SCALES - GENERAL: PAINLEVEL: EXTREME PAIN (8)

## 2019-01-03 NOTE — RESULT ENCOUNTER NOTE
Jeffy,    Your cholesterol numbers are worse than before.  Have you missed any of your cholesterol medication?  If so, take more atorvastatin more regularly.  If not, we might need to consider increasing your dose.    Other labs were in the normal range.    Have a nice day!    Dr. Browne      The 10-year ASCVD risk score ([place] FERNANDO Jr., et al., 2013) is: 19.9%    Values used to calculate the score:      Age: 68 years      Sex: Male      Is Non- : No      Diabetic: No      Tobacco smoker: Yes      Systolic Blood Pressure: 114 mmHg      Is BP treated: Yes      HDL Cholesterol: 39 mg/dL      Total Cholesterol: 187 mg/dL

## 2019-01-03 NOTE — PROGRESS NOTES
SUBJECTIVE:   Jeffy Rogers is a 68 year old male who presents to clinic today for the following health issues:    Concern - abscess on right shoulder blade  Onset: two weeks ago    Description:   Red swollen and painful    Intensity: severe    Progression of Symptoms:  worsening    Accompanying Signs & Symptoms:  Red and painful    Previous history of similar problem:   Yes 10 years ago     Precipitating factors:   Worsened by:     Alleviating factors:  Improved by: nothing    Therapies Tried and outcome: ibuprofen    Patient presents today for evaluation of an abscess on the right shoulder blade. He reports symptoms developed 2 weeks ago. Area has become red, inflamed and painful. He denies any drainage at this time. No fevers. He reports having similar symptoms 10 years ago and I&D done at that time.     Problem list and histories reviewed & adjusted, as indicated.  Additional history: as documented    Patient Active Problem List   Diagnosis     Cardiovascular disease     Hypothyroidism, unspecified type     Congenital anomaly of lung     Left shoulder pain     Primary insomnia     MRSA (methicillin resistant staph aureus) culture positive     Hyperlipidemia LDL goal <100     Merlos esophagus     Tobacco use disorder     CKD (chronic kidney disease) stage 3, GFR 30-59 ml/min (H)     Hypertension goal BP (blood pressure) < 140/90     Advanced directives, counseling/discussion     Major depression in complete remission (H)     Impaired fasting glucose     Panic attacks     Chronic lower back pain     SK (seborrheic keratosis)     Insomnia due to medical condition     Abnormal thyroid stimulating hormone level     Meralgia paresthetica of right side     Chronic pain of right lower extremity     BPH with urinary obstruction     Marijuana abuse     Past Surgical History:   Procedure Laterality Date     ABDOMEN SURGERY       ANGIOGRAM       aortic iliac renal bypass  2000     BACK SURGERY       C APPENDECTOMY  1969      C NONSPECIFIC PROCEDURE  08/1999    left common iliac angioplasty and stent placement     C NONSPECIFIC PROCEDURE  11/2000    aortal femoral bypass and repair of renal artery stenosis     C VEIN IN SITU BYPASS GRAFT,FEM-POP  04/07/2003    Right femoral to above knee popliteal area bypass with 8 mm Dacron graft. Aortogram with runoff.     ESOPHAGOSCOPY, GASTROSCOPY, DUODENOSCOPY (EGD), COMBINED  11/23/2010    COMBINED ESOPHAGOSCOPY, GASTROSCOPY, DUODENOSCOPY (EGD), BIOPSY SINGLE OR MULTIPLE performed by MADDY SANCHEZ at  GI     ESOPHAGOSCOPY, GASTROSCOPY, DUODENOSCOPY (EGD), COMBINED N/A 12/18/2015    Procedure: COMBINED ESOPHAGOSCOPY, GASTROSCOPY, DUODENOSCOPY (EGD), BIOPSY SINGLE OR MULTIPLE;  Surgeon: Benton Gutiérrez MD;  Location:  GI     HC COLONOSCOPY W BIOPSY  03/10/08     HC REMOVE TONSILS/ADENOIDS,<11 Y/O      unsure of age     HC UGI ENDOSCOPY, SIMPLE EXAM  10/13/09     HERNIA REPAIR, INGUINAL RT/LT  03/30/09    Right     RENAL ARTERY ANGIOGRAM       VASCULAR SURGERY         Social History     Tobacco Use     Smoking status: Current Every Day Smoker     Packs/day: 1.00     Years: 32.00     Pack years: 32.00     Types: Cigarettes     Smokeless tobacco: Never Used     Tobacco comment:  E-cigg, occasional cigs daily, hasnt smoked for a few weeks   Substance Use Topics     Alcohol use: No     Frequency: Never     Family History   Problem Relation Age of Onset     Alcohol/Drug Mother      Depression Mother      Diabetes Maternal Grandfather      Heart Disease Maternal Grandfather      Diabetes Maternal Grandmother      Alcohol/Drug Brother      Alcohol/Drug Sister          Current Outpatient Medications   Medication Sig Dispense Refill     ASCORBIC ACID 1000 MG PO TABS 1 TABLET DAILY AT DINNER 30 0     ASPIRIN 81 MG OR TABS 1 TABLET DAILY 100 0     atorvastatin (LIPITOR) 80 MG tablet TAKE 1 TABLET BY MOUTH EVERY DAY 30 tablet 0     CALCIUM CARBONATE-VIT D-MIN 7962-6500 MG-UNIT PO CHEW 1 TABLET  THREE TIMES DAILY       cephALEXin (KEFLEX) 500 MG capsule Take 1 capsule (500 mg) by mouth 3 times daily for 7 days 21 capsule 0     cyclobenzaprine (FLEXERIL) 10 MG tablet Take 1 tablet (10 mg) by mouth 3 times daily 270 tablet 0     DULoxetine (CYMBALTA) 60 MG EC capsule Take 1 capsule (60 mg) by mouth daily 90 capsule 1     dutasteride (AVODART) 0.5 MG capsule TAKE 1 CAPSULE (0.5 MG) BY MOUTH DAILY 90 capsule 3     hydrOXYzine (ATARAX) 25 MG tablet TAKE 1-2 TABLETS (25-50 MG) BY MOUTH EVERY 6 HOURS AS NEEDED FOR ANXIETY 180 tablet 2     lidocaine (LIDODERM) 5 % Patch Apply up to 3 patches to painful area at once for up to 12 h within a 24 h period.  Remove after 12 hours. 90 patch 3     lisinopril (PRINIVIL/ZESTRIL) 10 MG tablet Take 1 tablet (10 mg) by mouth daily 90 tablet 1     metoprolol (TOPROL-XL) 50 MG 24 hr tablet Take 1 tablet (50 mg) by mouth daily 90 tablet 2     MULTI-VITAMIN OR TABS 1 TABLET DAILY  0 0     pantoprazole (PROTONIX) 40 MG EC tablet Take 1 tablet (40 mg) by mouth daily 90 tablet 3     sertraline (ZOLOFT) 100 MG tablet TAKE 2 TABLETS (200 MG) BY MOUTH DAILY 180 tablet 3     traZODone (DESYREL) 100 MG tablet TAKE 2 TABLETS BY MOUTH AT BEDTIME 180 tablet 3     VITAMIN D 1000 UNIT OR CAPS 1 CAPSULE DAILY 0 0     varenicline (CHANTIX) 1 MG tablet Take 1 tablet (1 mg) by mouth 2 times daily (Patient not taking: Reported on 1/3/2019) 180 tablet 0     Allergies   Allergen Reactions     No Known Drug Allergies      BP Readings from Last 3 Encounters:   01/03/19 114/66   10/01/18 144/80   09/14/18 138/86    Wt Readings from Last 3 Encounters:   01/03/19 81.6 kg (180 lb)   10/01/18 83.5 kg (184 lb)   09/14/18 80.8 kg (178 lb 1.6 oz)         Reviewed and updated as needed this visit by clinical staff  Tobacco  Allergies  Meds  Med Hx  Surg Hx  Fam Hx  Soc Hx      Reviewed and updated as needed this visit by Provider       ROS:  Constitutional, HEENT, cardiovascular, pulmonary, gi and gu  "systems are negative, except as otherwise noted.    OBJECTIVE:     /66   Pulse 82   Temp 97.8  F (36.6  C) (Temporal)   Resp 16   Ht 1.803 m (5' 11\")   Wt 81.6 kg (180 lb)   SpO2 97%   BMI 25.10 kg/m    Body mass index is 25.1 kg/m .  GENERAL: healthy, alert and no distress  SKIN: erythematous, inflamed sebaceous cysts present on right posterior shoulder, area is tender to palpation  PSYCH: mentation appears normal, affect normal/bright    Procedure: Patient gives verbal permission for this procedure.  With the patient comfortable I anesthetized the affected area with Lidocaine 1% with epinephrine.  Proper anesthesia obtained with 2 mL. Anesthesia was confirmed with the patient.  I used an 11 blade and made a stab incision.  I was able to get out a medium amount of purulent drainage.  The wound was flushed with a 50/50 solution of peroxide and water then packed with iodoform gauze and a bandage placed.  Patient tolerated the procedure well.     Diagnostic Test Results:  none     ASSESSMENT/PLAN:     1. Infected sebaceous cyst  Patient presents today with an infected sebaceous cyst. I&D done today which patient tolerated well. Small amount of packing placed. Patient instructed to keep bandage and packing in place for 24 hours will then remove. He was instructed to keep affected area clean and dry. Keflex started as below. Patient instructed to monitor for signs of infection including increased redness, warmth, tenderness or drainage. Patient will follow-up in clinic if new symptoms develop or current symptoms fail to improve.  - cephALEXin (KEFLEX) 500 MG capsule; Take 1 capsule (500 mg) by mouth 3 times daily for 7 days  Dispense: 21 capsule; Refill: 0  - DRAIN SKIN ABSCESS SIMPLE/SINGLE    2. Hyperlipidemia LDL goal <100  - Comprehensive metabolic panel  - Lipid panel reflex to direct LDL Fasting    The patient indicates understanding of these issues and agrees with the plan.    Lise Thomason, " JORDAN  [facility]

## 2019-01-03 NOTE — PATIENT INSTRUCTIONS
Start Keflex three times daily for 1 week    Keep bandage on until tomorrow then OK to remove it and packing    Keep clean and dry

## 2019-01-04 DIAGNOSIS — F32.5 MAJOR DEPRESSION IN COMPLETE REMISSION (H): Primary | ICD-10-CM

## 2019-01-04 DIAGNOSIS — E78.5 HYPERLIPIDEMIA LDL GOAL <100: ICD-10-CM

## 2019-01-07 ENCOUNTER — TELEPHONE (OUTPATIENT)
Dept: FAMILY MEDICINE | Facility: CLINIC | Age: 69
End: 2019-01-07

## 2019-01-07 ENCOUNTER — MYC MEDICAL ADVICE (OUTPATIENT)
Dept: FAMILY MEDICINE | Facility: OTHER | Age: 69
End: 2019-01-07

## 2019-01-07 RX ORDER — ATORVASTATIN CALCIUM 80 MG/1
TABLET, FILM COATED ORAL
Qty: 90 TABLET | Refills: 0 | Status: SHIPPED | OUTPATIENT
Start: 2019-01-07 | End: 2019-04-10

## 2019-01-07 RX ORDER — SERTRALINE HYDROCHLORIDE 100 MG/1
TABLET, FILM COATED ORAL
Qty: 180 TABLET | Refills: 1 | Status: SHIPPED | OUTPATIENT
Start: 2019-01-07

## 2019-01-07 NOTE — TELEPHONE ENCOUNTER
Atorvastatin    Routing refill request to provider for review/approval because:  Labwork done 1/3/2019, ? Increase dose? See result note    Sertraline    Prescription approved per [facility] Refill Protocol.    Ivania Frances, RN, BSN

## 2019-01-07 NOTE — TELEPHONE ENCOUNTER
Summary:    Patient is due/failing the following:   PHQ9    Action needed:   Patient needs to do PHQ9.    Type of outreach:    Sent [employer] message.    Questions for provider review:    None                                                                                                                                    Erika Irwin       Chart routed to Care Team .      Panel Management Review      Patient has the following on his problem list:   Depression / Dysthymia review    Measure:  Needs PHQ-9 score of 4 or less during index window.  Administer PHQ-9 and if score is 5 or more, send encounter to provider for next steps.    PHQ-9 SCORE 6/27/2018 8/9/2018 9/14/2018   PHQ-9 Total Score - - -   PHQ-9 Total Score MyChart 13 (Moderate depression) 12 (Moderate depression) 6 (Mild depression)   PHQ-9 Total Score 13 12 6       If PHQ-9 recheck is 5 or more, route to provider for next steps.    Patient is due for:  PHQ9    Hypertension   Last three blood pressure readings:  BP Readings from Last 3 Encounters:   01/03/19 114/66   10/01/18 144/80   09/14/18 138/86     Blood pressure: Passed    HTN Guidelines:  Age 18-59 BP range:  Less than 140/90  Age 60-85 with Diabetes:  Less than 140/90  Age 60-85 without Diabetes:  less than 150/90      Composite cancer screening  Chart review shows that this patient is due/due soon for the following None

## 2019-01-07 NOTE — LETTER
[address]   [facility] [address]  Phone: [phone]  February 6, 2019      Jeffy Rogers  [id number]  [facility] [address]      Dear Jeffy,    We care about your health and have reviewed your health plan including your medical conditions, medications, and lab results.  Based on this review, it is recommended that you follow up regarding the following health topic(s):  -Depression    We recommend you take the following action(s):  -Complete and return the attached PHQ-9 Form.  If your total score is greater than 9, please schedule a followup appointment.  If you answer Yes to question 9, call your clinic between the hours of 8 to 5.  You may also call the Suicide Hotline at 5-649-259-KAHX ([phone]) any time.     Please call us at the [facility] - [phone] (or use ShowMe) to address the above recommendations.     Thank you for trusting [facility] and we appreciate the opportunity to serve you.  We look forward to supporting your healthcare needs in the future.    Healthy Regards,    Your Health Care Team  [facility]

## 2019-01-11 DIAGNOSIS — F41.0 PANIC ATTACK: ICD-10-CM

## 2019-01-11 RX ORDER — HYDROXYZINE HYDROCHLORIDE 25 MG/1
TABLET, FILM COATED ORAL
Qty: 180 TABLET | Refills: 1 | Status: SHIPPED | OUTPATIENT
Start: 2019-01-11 | End: 2019-03-22

## 2019-01-11 NOTE — TELEPHONE ENCOUNTER
Prescription approved per [facility] Refill Protocol.  Eduardo Tyler, RN, BSN

## 2019-02-07 DIAGNOSIS — G89.29 CHRONIC PAIN OF RIGHT LOWER EXTREMITY: ICD-10-CM

## 2019-02-07 DIAGNOSIS — M54.41 CHRONIC RIGHT-SIDED LOW BACK PAIN WITH RIGHT-SIDED SCIATICA: ICD-10-CM

## 2019-02-07 DIAGNOSIS — M79.2 NERVE PAIN: ICD-10-CM

## 2019-02-07 DIAGNOSIS — M79.604 CHRONIC PAIN OF RIGHT LOWER EXTREMITY: ICD-10-CM

## 2019-02-07 DIAGNOSIS — G89.29 CHRONIC RIGHT-SIDED LOW BACK PAIN WITH RIGHT-SIDED SCIATICA: ICD-10-CM

## 2019-02-07 RX ORDER — CYCLOBENZAPRINE HCL 10 MG
10 TABLET ORAL 3 TIMES DAILY
Qty: 270 TABLET | Refills: 0 | Status: SHIPPED | OUTPATIENT
Start: 2019-02-07

## 2019-02-07 NOTE — TELEPHONE ENCOUNTER
Requested Prescriptions   Pending Prescriptions Disp Refills     cyclobenzaprine (FLEXERIL) 10 MG tablet [Pharmacy Med Name: CYCLOBENZAPRINE 10 MG TABLET] 270 tablet 0     Sig: TAKE 1 TABLET (10 MG) BY MOUTH 3 TIMES DAILY    There is no refill protocol information for this order        cyclobenzaprine (FLEXERIL) 10 MG tablet      Last Written Prescription Date:  09/14/201/8  Last Fill Quantity: 270,   # refills: 0  Last Office Visit: 01/03/2019  Future Office visit:       Routing refill request to provider for review/approval because:  Drug not on the [facility], [facility] or [facility] refill protocol or controlled substance  Paradise Mendoza RN, BSN

## 2019-02-08 NOTE — TELEPHONE ENCOUNTER
Your medication has been refilled.  Please schedule a visit to follow up on how this medication is working for you before your next refill.  We need to be sure everything is working well and stable prior to further refills.

## 2019-02-12 NOTE — TELEPHONE ENCOUNTER
Spoke with wife Jessie (c2c is on file) informed her of message below  No further questions   Closing encounter  Ilda Bryant RT (R)

## 2019-03-08 DIAGNOSIS — I10 HYPERTENSION GOAL BP (BLOOD PRESSURE) < 140/90: ICD-10-CM

## 2019-03-08 RX ORDER — LISINOPRIL 10 MG/1
TABLET ORAL
Qty: 90 TABLET | Refills: 3 | Status: SHIPPED | OUTPATIENT
Start: 2019-03-08 | End: 2019-04-10

## 2019-03-08 NOTE — TELEPHONE ENCOUNTER
"Requested Prescriptions   Pending Prescriptions Disp Refills     lisinopril (PRINIVIL/ZESTRIL) 10 MG tablet [Pharmacy Med Name: LISINOPRIL 10 MG TABLET] 90 tablet 1     Sig: TAKE 1 TABLET BY MOUTH EVERY DAY    ACE Inhibitors (Including Combos) Protocol Passed - 3/8/2019  9:27 AM       Passed - Blood pressure under 140/90 in past 12 months    BP Readings from Last 3 Encounters:   01/03/19 114/66   10/01/18 144/80   09/14/18 138/86          Passed - Recent (12 mo) or future (30 days) visit within the authorizing provider's specialty    Patient had office visit in the last 12 months or has a visit in the next 30 days with authorizing provider or within the authorizing provider's specialty.  See \"Patient Info\" tab in inbasket, or \"Choose Columns\" in Meds & Orders section of the refill encounter.           Passed - Medication is active on med list       Passed - Patient is age 18 or older       Passed - Normal serum creatinine on file in past 12 months    Recent Labs   Lab Test 01/03/19  1132  06/13/11  0947   CR 1.21   < >  --    CREAT  --   --  1.5*    < > = values in this interval not displayed.            Passed - Normal serum potassium on file in past 12 months    Recent Labs   Lab Test 01/03/19  1132   POTASSIUM 4.4               Last OV: 01/03/2019    Prescription approved per [facility] Refill Protocol.    Alfonso oCelho, RN, BSN          "

## 2019-03-21 ENCOUNTER — TELEPHONE (OUTPATIENT)
Dept: FAMILY MEDICINE | Facility: OTHER | Age: 69
End: 2019-03-21

## 2019-03-21 DIAGNOSIS — I10 HYPERTENSION GOAL BP (BLOOD PRESSURE) < 140/90: ICD-10-CM

## 2019-03-21 NOTE — TELEPHONE ENCOUNTER
Metoprolol  Routing refill request to provider for review/approval because:  A break in medication    Sofia Newsome, RN, BSN           done

## 2019-03-22 DIAGNOSIS — F41.0 PANIC ATTACK: ICD-10-CM

## 2019-03-22 RX ORDER — HYDROXYZINE HYDROCHLORIDE 25 MG/1
TABLET, FILM COATED ORAL
Qty: 180 TABLET | Refills: 3 | Status: SHIPPED | OUTPATIENT
Start: 2019-03-22

## 2019-03-22 NOTE — TELEPHONE ENCOUNTER
Atarax  Prescription approved per [facility] Refill Protocol.    Sofia Newsome, RN, BSN

## 2019-03-25 RX ORDER — METOPROLOL SUCCINATE 50 MG/1
50 TABLET, EXTENDED RELEASE ORAL DAILY
Qty: 30 TABLET | Refills: 0 | Status: SHIPPED | OUTPATIENT
Start: 2019-03-25 | End: 2019-04-10

## 2019-03-25 NOTE — TELEPHONE ENCOUNTER
LM for pt to return call to the clinic.   Please review message below and assist with scheduling when call is returned.   Chan Anderson MA  March 25, 2019

## 2019-04-05 ENCOUNTER — TELEPHONE (OUTPATIENT)
Dept: FAMILY MEDICINE | Facility: OTHER | Age: 69
End: 2019-04-05

## 2019-04-05 DIAGNOSIS — I10 HYPERTENSION GOAL BP (BLOOD PRESSURE) < 140/90: ICD-10-CM

## 2019-04-05 DIAGNOSIS — F41.0 PANIC ATTACK: ICD-10-CM

## 2019-04-05 DIAGNOSIS — E78.5 HYPERLIPIDEMIA LDL GOAL <100: ICD-10-CM

## 2019-04-05 DIAGNOSIS — G57.11 MERALGIA PARESTHETICA OF RIGHT SIDE: ICD-10-CM

## 2019-04-05 NOTE — TELEPHONE ENCOUNTER
Reason for Call:  Other call back    Detailed comments: please call nuno.  Pt is needing help filling some rx's and some other issues with some meds.  Please call to discuss.      Phone Number Patient can be reached at: Cell number on file:    Telephone Information:   Mobile [phone]       Best Time: any    Can we leave a detailed message on this number? YES    Call taken on 4/5/2019 at 12:48 PM by Abby Miranda

## 2019-04-09 ENCOUNTER — TELEPHONE (OUTPATIENT)
Dept: FAMILY MEDICINE | Facility: OTHER | Age: 69
End: 2019-04-09

## 2019-04-09 DIAGNOSIS — N13.8 BPH WITH URINARY OBSTRUCTION: Primary | ICD-10-CM

## 2019-04-09 DIAGNOSIS — N40.1 BPH WITH URINARY OBSTRUCTION: Primary | ICD-10-CM

## 2019-04-09 NOTE — TELEPHONE ENCOUNTER
Jessie returns call and message is relayed to her per Dr Browne message.    Jessie asks for a return call from nurse as she has more information she would like to give/discuss with her.

## 2019-04-09 NOTE — TELEPHONE ENCOUNTER
"Patient's wife called back to inform 's team that he is in long-term and is unable to come in. They are unsure how long he will be there. Jessie is contacting the clinic because Jeffy is needing his medications and to update .   1. Since he has been in long-term, the medical staff has tripled his Lisinopril, because his BP has been \"viviana high,\" per Jessie. Jessie was unable to tell me what his BP readings have been.  Request to send a  2. Patient has been taken off two medications. Trazodone and Flexeril. The staff won't give the patient the medication, because he needs a doctor's order. Patient hasn't been able to sleep well.   3. Patient is taking the Metoprolol, but insurance won't pay it unless it is prescribed for 3 months at a time. The staff at the long-term are giving him this medication.   4. The patient has been placed on Tylenol since he has been in long-term. Wondering if this is an okay therapy, since he has one kidney?  5. The staff at the long-term placed him on Flomax.   6. Wondering if there is anything the team can do so the patient can have his medications? He needs all medications refilled.   LM For Jessie to return call to find out which pharmacy they use. Jessie was informed that  is unavailable until tomorrow. (4/10/19) Please advise.   Chan Anderson MA  April 9, 2019    "

## 2019-04-09 NOTE — TELEPHONE ENCOUNTER
Unless he's very briefly in [facility] (under a week), the [facility] staff have medical staff available for this sort of thing.  If not, they should at least send me some sort of information on how he's doing and what they need.

## 2019-04-09 NOTE — TELEPHONE ENCOUNTER
Some Doctor at the [facility].     Spoke with Jessie.    [facility]  They took awake Trazodone and the Flexeril script.    Nurse [phone]      Needs 3 months at a time due to INS at [facility] as they can not afford these out of pocket.   Per wife he needs the below written for 90 days.  Lisinopril x 3 =30 mg. And added flomax  Refill ASA  Cymbalta  metoprolol   Sertraline? Should have had 2 months left    Spoke with Nurse Redd at the [facility].  She will have patient JAYJAY and release visit records for DJ to review and possible prescribe the changes.   Fax number given to nurse.  Will await fax tomorrow.    Eduardo Tyler, RN, BSN

## 2019-04-09 NOTE — TELEPHONE ENCOUNTER
Reason for Call:  Other returning call    Detailed comments: Jessie called clinic back. She called to let clinic know patient had BP reading of 180/40, then down to 150/90 after taking 3x amount of Lisinopril. She also stated that pharmacy they will be using is [facility].     Phone Number Patient can be reached at: [phone]    Best Time: any    Can we leave a detailed message on this number? YES    Call taken on 4/9/2019 at 3:38 PM by Mahamed Solomon

## 2019-04-09 NOTE — TELEPHONE ENCOUNTER
Reason for Call:  Medication or medication refill:    Do you use a [facility]?  Name of the pharmacy and phone number for the current request:  [facility] - [phone]    Name of the medication requested: FLOMAX    Other request: Please send refill.     Can we leave a detailed message on this number? YES    Phone number patient can be reached at: Cell number on file:    Telephone Information:   Mobile [phone]       Best Time: any    Call taken on 4/9/2019 at 3:41 PM by Mahamed Solomon

## 2019-04-10 ENCOUNTER — TRANSFERRED RECORDS (OUTPATIENT)
Dept: HEALTH INFORMATION MANAGEMENT | Facility: CLINIC | Age: 69
End: 2019-04-10

## 2019-04-10 RX ORDER — METOPROLOL SUCCINATE 50 MG/1
50 TABLET, EXTENDED RELEASE ORAL DAILY
Qty: 90 TABLET | Refills: 0 | Status: SHIPPED | OUTPATIENT
Start: 2019-04-10

## 2019-04-10 RX ORDER — ATORVASTATIN CALCIUM 80 MG/1
TABLET, FILM COATED ORAL
Qty: 90 TABLET | Refills: 0 | Status: SHIPPED | OUTPATIENT
Start: 2019-04-10

## 2019-04-10 RX ORDER — LISINOPRIL 10 MG/1
30 TABLET ORAL DAILY
Qty: 270 TABLET | Refills: 0 | Status: SHIPPED | OUTPATIENT
Start: 2019-04-10

## 2019-04-10 RX ORDER — TAMSULOSIN HYDROCHLORIDE 0.4 MG/1
0.4 CAPSULE ORAL DAILY
Qty: 90 CAPSULE | Refills: 0 | Status: SHIPPED | OUTPATIENT
Start: 2019-04-10

## 2019-04-10 RX ORDER — DULOXETIN HYDROCHLORIDE 60 MG/1
60 CAPSULE, DELAYED RELEASE ORAL DAILY
Qty: 90 CAPSULE | Refills: 0 | Status: SHIPPED | OUTPATIENT
Start: 2019-04-10

## 2019-04-10 NOTE — TELEPHONE ENCOUNTER
Received paperwork from care home.  Pt is currently taking 30 mg of lisinopril daily and has adequate BP control.  I can't override their policies on sedatives.  Renewed meds note below.  If he remains in care home after 90 days, I will not be able to refill without an office visit.

## 2019-05-17 ENCOUNTER — TELEPHONE (OUTPATIENT)
Dept: FAMILY MEDICINE | Facility: OTHER | Age: 69
End: 2019-05-17

## 2019-09-28 ENCOUNTER — HEALTH MAINTENANCE LETTER (OUTPATIENT)
Age: 69
End: 2019-09-28

## 2020-03-15 ENCOUNTER — HEALTH MAINTENANCE LETTER (OUTPATIENT)
Age: 70
End: 2020-03-15

## 2021-01-09 ENCOUNTER — HEALTH MAINTENANCE LETTER (OUTPATIENT)
Age: 71
End: 2021-01-09

## 2021-05-08 ENCOUNTER — HEALTH MAINTENANCE LETTER (OUTPATIENT)
Age: 71
End: 2021-05-08

## 2021-10-23 ENCOUNTER — HEALTH MAINTENANCE LETTER (OUTPATIENT)
Age: 71
End: 2021-10-23

## 2022-06-04 ENCOUNTER — HEALTH MAINTENANCE LETTER (OUTPATIENT)
Age: 72
End: 2022-06-04

## 2022-10-09 ENCOUNTER — HEALTH MAINTENANCE LETTER (OUTPATIENT)
Age: 72
End: 2022-10-09

## 2023-06-10 ENCOUNTER — HEALTH MAINTENANCE LETTER (OUTPATIENT)
Age: 73
End: 2023-06-10
